# Patient Record
Sex: FEMALE | URBAN - METROPOLITAN AREA
[De-identification: names, ages, dates, MRNs, and addresses within clinical notes are randomized per-mention and may not be internally consistent; named-entity substitution may affect disease eponyms.]

---

## 2023-12-12 ENCOUNTER — HOSPITAL ENCOUNTER (OUTPATIENT)
Age: 88
Setting detail: SPECIMEN
Discharge: HOME | End: 2023-12-12

## 2023-12-12 LAB
ALBUMIN SERPL-MCNC: 3.9 G/DL (ref 3.5–5.2)
ALP SERPL-CCNC: 96 U/L (ref 35–104)
ALT SERPL-CCNC: 15 U/L (ref 5–33)
ANION GAP SERPL CALCULATED.3IONS-SCNC: 13 MMOL/L (ref 9–17)
AST SERPL-CCNC: 27 U/L
BILIRUB SERPL-MCNC: 0.2 MG/DL (ref 0.3–1.2)
BUN SERPL-MCNC: 38 MG/DL (ref 8–23)
BUN/CREAT SERPL: 29 (ref 9–20)
CALCIUM SERPL-MCNC: 9 MG/DL (ref 8.6–10.4)
CHLORIDE SERPL-SCNC: 105 MMOL/L (ref 98–107)
CO2 SERPL-SCNC: 21 MMOL/L (ref 20–31)
CREAT SERPL-MCNC: 1.3 MG/DL (ref 0.5–0.9)
ERYTHROCYTE [DISTWIDTH] IN BLOOD BY AUTOMATED COUNT: 12.7 % (ref 11.8–14.4)
GFR SERPL CREATININE-BSD FRML MDRD: 40 ML/MIN/1.73M2
GLUCOSE SERPL-MCNC: 81 MG/DL (ref 70–99)
HCT VFR BLD AUTO: 35.3 % (ref 36.3–47.1)
HGB BLD-MCNC: 11.7 G/DL (ref 11.9–15.1)
MCH RBC QN AUTO: 32.3 PG (ref 25.2–33.5)
MCHC RBC AUTO-ENTMCNC: 33.1 G/DL (ref 28.4–34.8)
MCV RBC AUTO: 97.5 FL (ref 82.6–102.9)
NRBC BLD-RTO: 0 PER 100 WBC
PLATELET # BLD AUTO: 258 K/UL (ref 138–453)
PMV BLD AUTO: 11.2 FL (ref 8.1–13.5)
POTASSIUM SERPL-SCNC: 4 MMOL/L (ref 3.7–5.3)
PROT SERPL-MCNC: 7 G/DL (ref 6.4–8.3)
RBC # BLD AUTO: 3.62 M/UL (ref 3.95–5.11)
SODIUM SERPL-SCNC: 139 MMOL/L (ref 135–144)
WBC OTHER # BLD: 9.9 K/UL (ref 3.5–11.3)

## 2023-12-12 PROCEDURE — 80053 COMPREHEN METABOLIC PANEL: CPT

## 2023-12-12 PROCEDURE — 36415 COLL VENOUS BLD VENIPUNCTURE: CPT

## 2023-12-12 PROCEDURE — P9603 ONE-WAY ALLOW PRORATED MILES: HCPCS

## 2023-12-12 PROCEDURE — 85027 COMPLETE CBC AUTOMATED: CPT

## 2024-01-09 ENCOUNTER — APPOINTMENT (OUTPATIENT)
Dept: CT IMAGING | Age: 89
End: 2024-01-09
Payer: COMMERCIAL

## 2024-01-09 ENCOUNTER — HOSPITAL ENCOUNTER (INPATIENT)
Age: 89
LOS: 3 days | Discharge: NURSING, REHAB OR LONG-TERM CARE | End: 2024-01-12
Attending: EMERGENCY MEDICINE | Admitting: PSYCHIATRY & NEUROLOGY
Payer: COMMERCIAL

## 2024-01-09 DIAGNOSIS — I63.9 CEREBROVASCULAR ACCIDENT (CVA), UNSPECIFIED MECHANISM (HCC): Primary | ICD-10-CM

## 2024-01-09 LAB
ANION GAP SERPL CALCULATED.3IONS-SCNC: 9 MMOL/L (ref 9–17)
BASOPHILS # BLD: 0.08 K/UL (ref 0–0.2)
BASOPHILS NFR BLD: 1 % (ref 0–2)
BUN BLD-MCNC: 32 MG/DL (ref 8–26)
BUN SERPL-MCNC: 33 MG/DL (ref 8–23)
CA-I BLD-SCNC: 1.14 MMOL/L (ref 1.15–1.33)
CALCIUM SERPL-MCNC: 8.4 MG/DL (ref 8.6–10.4)
CHLORIDE BLD-SCNC: 106 MMOL/L (ref 98–107)
CHLORIDE SERPL-SCNC: 103 MMOL/L (ref 98–107)
CK SERPL-CCNC: 72 U/L (ref 26–192)
CO2 BLD CALC-SCNC: 26 MMOL/L (ref 22–30)
CO2 SERPL-SCNC: 25 MMOL/L (ref 20–31)
CREAT SERPL-MCNC: 1.4 MG/DL (ref 0.5–0.9)
EGFR, POC: 37 ML/MIN/1.73M2
EOSINOPHIL # BLD: 0.23 K/UL (ref 0–0.44)
EOSINOPHILS RELATIVE PERCENT: 3 % (ref 1–4)
ERYTHROCYTE [DISTWIDTH] IN BLOOD BY AUTOMATED COUNT: 12.3 % (ref 11.8–14.4)
GFR SERPL CREATININE-BSD FRML MDRD: 37 ML/MIN/1.73M2
GLUCOSE BLD-MCNC: 105 MG/DL (ref 74–100)
GLUCOSE SERPL-MCNC: 108 MG/DL (ref 70–99)
HCO3 VENOUS: 26.9 MMOL/L (ref 22–29)
HCT VFR BLD AUTO: 34.6 % (ref 36.3–47.1)
HCT VFR BLD AUTO: 35 % (ref 36–46)
HGB BLD-MCNC: 11.6 G/DL (ref 11.9–15.1)
IMM GRANULOCYTES # BLD AUTO: 0.05 K/UL (ref 0–0.3)
IMM GRANULOCYTES NFR BLD: 1 %
INR PPP: 0.9
LYMPHOCYTES NFR BLD: 2.41 K/UL (ref 1.1–3.7)
LYMPHOCYTES RELATIVE PERCENT: 27 % (ref 24–43)
MCH RBC QN AUTO: 32 PG (ref 25.2–33.5)
MCHC RBC AUTO-ENTMCNC: 33.5 G/DL (ref 28.4–34.8)
MCV RBC AUTO: 95.6 FL (ref 82.6–102.9)
MONOCYTES NFR BLD: 1.04 K/UL (ref 0.1–1.2)
MONOCYTES NFR BLD: 12 % (ref 3–12)
MYOGLOBIN SERPL-MCNC: 46 NG/ML (ref 25–58)
NEUTROPHILS NFR BLD: 56 % (ref 36–65)
NEUTS SEG NFR BLD: 4.97 K/UL (ref 1.5–8.1)
NRBC BLD-RTO: 0 PER 100 WBC
O2 SAT, VEN: 38.5 % (ref 60–85)
PARTIAL THROMBOPLASTIN TIME: 24.5 SEC (ref 23–36.5)
PCO2, VEN: 46.2 MM HG (ref 41–51)
PH VENOUS: 7.37 (ref 7.32–7.43)
PLATELET # BLD AUTO: 248 K/UL (ref 138–453)
PMV BLD AUTO: 10.5 FL (ref 8.1–13.5)
PO2, VEN: 23.3 MM HG (ref 30–50)
POC ANION GAP: 11 MMOL/L (ref 7–16)
POC CREATININE: 1.4 MG/DL (ref 0.51–1.19)
POC HEMOGLOBIN (CALC): 11.9 G/DL (ref 12–16)
POC LACTIC ACID: 0.7 MMOL/L (ref 0.56–1.39)
POSITIVE BASE EXCESS, VEN: 1.2 MMOL/L (ref 0–3)
POTASSIUM BLD-SCNC: 4 MMOL/L (ref 3.5–4.5)
POTASSIUM SERPL-SCNC: 3.9 MMOL/L (ref 3.7–5.3)
PROTHROMBIN TIME: 12.2 SEC (ref 11.7–14.9)
RBC # BLD AUTO: 3.62 M/UL (ref 3.95–5.11)
SODIUM BLD-SCNC: 142 MMOL/L (ref 138–146)
SODIUM SERPL-SCNC: 137 MMOL/L (ref 135–144)
TROPONIN I SERPL HS-MCNC: 10 NG/L (ref 0–14)
WBC OTHER # BLD: 8.8 K/UL (ref 3.5–11.3)

## 2024-01-09 PROCEDURE — 83605 ASSAY OF LACTIC ACID: CPT

## 2024-01-09 PROCEDURE — 83874 ASSAY OF MYOGLOBIN: CPT

## 2024-01-09 PROCEDURE — 82947 ASSAY GLUCOSE BLOOD QUANT: CPT

## 2024-01-09 PROCEDURE — 70450 CT HEAD/BRAIN W/O DYE: CPT

## 2024-01-09 PROCEDURE — 2060000000 HC ICU INTERMEDIATE R&B

## 2024-01-09 PROCEDURE — 84520 ASSAY OF UREA NITROGEN: CPT

## 2024-01-09 PROCEDURE — 99285 EMERGENCY DEPT VISIT HI MDM: CPT

## 2024-01-09 PROCEDURE — 82803 BLOOD GASES ANY COMBINATION: CPT

## 2024-01-09 PROCEDURE — 82565 ASSAY OF CREATININE: CPT

## 2024-01-09 PROCEDURE — 80048 BASIC METABOLIC PNL TOTAL CA: CPT

## 2024-01-09 PROCEDURE — 82550 ASSAY OF CK (CPK): CPT

## 2024-01-09 PROCEDURE — 85610 PROTHROMBIN TIME: CPT

## 2024-01-09 PROCEDURE — 85025 COMPLETE CBC W/AUTO DIFF WBC: CPT

## 2024-01-09 PROCEDURE — 82553 CREATINE MB FRACTION: CPT

## 2024-01-09 PROCEDURE — 80051 ELECTROLYTE PANEL: CPT

## 2024-01-09 PROCEDURE — 85014 HEMATOCRIT: CPT

## 2024-01-09 PROCEDURE — 85730 THROMBOPLASTIN TIME PARTIAL: CPT

## 2024-01-09 PROCEDURE — 82330 ASSAY OF CALCIUM: CPT

## 2024-01-09 PROCEDURE — 84484 ASSAY OF TROPONIN QUANT: CPT

## 2024-01-09 RX ORDER — LABETALOL HYDROCHLORIDE 5 MG/ML
10 INJECTION, SOLUTION INTRAVENOUS ONCE
Status: DISCONTINUED | OUTPATIENT
Start: 2024-01-09 | End: 2024-01-12 | Stop reason: HOSPADM

## 2024-01-09 RX ORDER — CLOPIDOGREL 300 MG/1
300 TABLET, FILM COATED ORAL ONCE
Status: COMPLETED | OUTPATIENT
Start: 2024-01-09 | End: 2024-01-10

## 2024-01-10 ENCOUNTER — APPOINTMENT (OUTPATIENT)
Dept: MRI IMAGING | Age: 89
End: 2024-01-10
Payer: COMMERCIAL

## 2024-01-10 PROBLEM — Z71.89 ACP (ADVANCE CARE PLANNING): Status: ACTIVE | Noted: 2024-01-10

## 2024-01-10 PROBLEM — Z51.5 ENCOUNTER FOR PALLIATIVE CARE: Status: ACTIVE | Noted: 2024-01-10

## 2024-01-10 LAB
ANION GAP SERPL CALCULATED.3IONS-SCNC: 9 MMOL/L (ref 9–17)
BUN SERPL-MCNC: 30 MG/DL (ref 8–23)
CALCIUM SERPL-MCNC: 9.1 MG/DL (ref 8.6–10.4)
CHLORIDE SERPL-SCNC: 105 MMOL/L (ref 98–107)
CHOLEST SERPL-MCNC: 123 MG/DL
CHOLESTEROL/HDL RATIO: 2.3
CO2 SERPL-SCNC: 27 MMOL/L (ref 20–31)
CREAT SERPL-MCNC: 1.4 MG/DL (ref 0.5–0.9)
EKG ATRIAL RATE: 68 BPM
EKG P AXIS: 14 DEGREES
EKG P-R INTERVAL: 138 MS
EKG Q-T INTERVAL: 440 MS
EKG QRS DURATION: 76 MS
EKG QTC CALCULATION (BAZETT): 467 MS
EKG R AXIS: 21 DEGREES
EKG T AXIS: -22 DEGREES
EKG VENTRICULAR RATE: 68 BPM
ERYTHROCYTE [DISTWIDTH] IN BLOOD BY AUTOMATED COUNT: 12.3 % (ref 11.8–14.4)
EST. AVERAGE GLUCOSE BLD GHB EST-MCNC: 82 MG/DL
GFR SERPL CREATININE-BSD FRML MDRD: 37 ML/MIN/1.73M2
GLUCOSE SERPL-MCNC: 104 MG/DL (ref 70–99)
HBA1C MFR BLD: 4.5 % (ref 4–6)
HCT VFR BLD AUTO: 35.3 % (ref 36.3–47.1)
HDLC SERPL-MCNC: 54 MG/DL
HGB BLD-MCNC: 12.1 G/DL (ref 11.9–15.1)
LDLC SERPL CALC-MCNC: 49 MG/DL (ref 0–130)
MCH RBC QN AUTO: 31.8 PG (ref 25.2–33.5)
MCHC RBC AUTO-ENTMCNC: 34.3 G/DL (ref 28.4–34.8)
MCV RBC AUTO: 92.7 FL (ref 82.6–102.9)
NRBC BLD-RTO: 0 PER 100 WBC
PLATELET # BLD AUTO: 269 K/UL (ref 138–453)
PMV BLD AUTO: 10.3 FL (ref 8.1–13.5)
POTASSIUM SERPL-SCNC: 4 MMOL/L (ref 3.7–5.3)
RBC # BLD AUTO: 3.81 M/UL (ref 3.95–5.11)
SODIUM SERPL-SCNC: 141 MMOL/L (ref 135–144)
TRIGL SERPL-MCNC: 98 MG/DL
WBC OTHER # BLD: 9 K/UL (ref 3.5–11.3)

## 2024-01-10 PROCEDURE — 2060000000 HC ICU INTERMEDIATE R&B

## 2024-01-10 PROCEDURE — 94761 N-INVAS EAR/PLS OXIMETRY MLT: CPT

## 2024-01-10 PROCEDURE — 83036 HEMOGLOBIN GLYCOSYLATED A1C: CPT

## 2024-01-10 PROCEDURE — 80061 LIPID PANEL: CPT

## 2024-01-10 PROCEDURE — 99221 1ST HOSP IP/OBS SF/LOW 40: CPT | Performed by: INTERNAL MEDICINE

## 2024-01-10 PROCEDURE — 2580000003 HC RX 258

## 2024-01-10 PROCEDURE — 6360000002 HC RX W HCPCS

## 2024-01-10 PROCEDURE — 80048 BASIC METABOLIC PNL TOTAL CA: CPT

## 2024-01-10 PROCEDURE — 6370000000 HC RX 637 (ALT 250 FOR IP): Performed by: STUDENT IN AN ORGANIZED HEALTH CARE EDUCATION/TRAINING PROGRAM

## 2024-01-10 PROCEDURE — 92523 SPEECH SOUND LANG COMPREHEN: CPT

## 2024-01-10 PROCEDURE — 70551 MRI BRAIN STEM W/O DYE: CPT

## 2024-01-10 PROCEDURE — 6370000000 HC RX 637 (ALT 250 FOR IP)

## 2024-01-10 PROCEDURE — 93005 ELECTROCARDIOGRAM TRACING: CPT

## 2024-01-10 PROCEDURE — 85027 COMPLETE CBC AUTOMATED: CPT

## 2024-01-10 PROCEDURE — 93010 ELECTROCARDIOGRAM REPORT: CPT | Performed by: INTERNAL MEDICINE

## 2024-01-10 RX ORDER — ASPIRIN 81 MG/1
81 TABLET ORAL DAILY
Status: DISCONTINUED | OUTPATIENT
Start: 2024-01-10 | End: 2024-01-12 | Stop reason: HOSPADM

## 2024-01-10 RX ORDER — SODIUM CHLORIDE 0.9 % (FLUSH) 0.9 %
5-40 SYRINGE (ML) INJECTION EVERY 12 HOURS SCHEDULED
Status: DISCONTINUED | OUTPATIENT
Start: 2024-01-10 | End: 2024-01-12 | Stop reason: HOSPADM

## 2024-01-10 RX ORDER — CLONAZEPAM 0.5 MG/1
0.5 TABLET ORAL EVERY 12 HOURS PRN
Status: DISCONTINUED | OUTPATIENT
Start: 2024-01-10 | End: 2024-01-12 | Stop reason: HOSPADM

## 2024-01-10 RX ORDER — MEMANTINE HYDROCHLORIDE 5 MG/1
10 TABLET ORAL 2 TIMES DAILY
Status: DISCONTINUED | OUTPATIENT
Start: 2024-01-10 | End: 2024-01-12

## 2024-01-10 RX ORDER — PAROXETINE HYDROCHLORIDE 20 MG/1
20 TABLET, FILM COATED ORAL DAILY
Status: DISCONTINUED | OUTPATIENT
Start: 2024-01-10 | End: 2024-01-12 | Stop reason: HOSPADM

## 2024-01-10 RX ORDER — ROSUVASTATIN CALCIUM 10 MG/1
20 TABLET, COATED ORAL NIGHTLY
Status: DISCONTINUED | OUTPATIENT
Start: 2024-01-10 | End: 2024-01-12

## 2024-01-10 RX ORDER — SODIUM CHLORIDE 0.9 % (FLUSH) 0.9 %
5-40 SYRINGE (ML) INJECTION PRN
Status: DISCONTINUED | OUTPATIENT
Start: 2024-01-10 | End: 2024-01-12 | Stop reason: HOSPADM

## 2024-01-10 RX ORDER — ROSUVASTATIN CALCIUM 20 MG/1
40 TABLET, COATED ORAL NIGHTLY
Status: DISCONTINUED | OUTPATIENT
Start: 2024-01-10 | End: 2024-01-10

## 2024-01-10 RX ORDER — ONDANSETRON 2 MG/ML
4 INJECTION INTRAMUSCULAR; INTRAVENOUS EVERY 6 HOURS PRN
Status: DISCONTINUED | OUTPATIENT
Start: 2024-01-10 | End: 2024-01-12 | Stop reason: HOSPADM

## 2024-01-10 RX ORDER — ONDANSETRON 4 MG/1
4 TABLET, ORALLY DISINTEGRATING ORAL EVERY 8 HOURS PRN
Status: DISCONTINUED | OUTPATIENT
Start: 2024-01-10 | End: 2024-01-12 | Stop reason: HOSPADM

## 2024-01-10 RX ORDER — CHOLECALCIFEROL (VITAMIN D3) 125 MCG
5 CAPSULE ORAL NIGHTLY
Status: DISCONTINUED | OUTPATIENT
Start: 2024-01-10 | End: 2024-01-12 | Stop reason: HOSPADM

## 2024-01-10 RX ORDER — POLYETHYLENE GLYCOL 3350 17 G/17G
17 POWDER, FOR SOLUTION ORAL DAILY PRN
Status: DISCONTINUED | OUTPATIENT
Start: 2024-01-10 | End: 2024-01-12 | Stop reason: HOSPADM

## 2024-01-10 RX ORDER — ENOXAPARIN SODIUM 100 MG/ML
30 INJECTION SUBCUTANEOUS DAILY
Status: DISCONTINUED | OUTPATIENT
Start: 2024-01-10 | End: 2024-01-12 | Stop reason: HOSPADM

## 2024-01-10 RX ORDER — SODIUM CHLORIDE 9 MG/ML
INJECTION, SOLUTION INTRAVENOUS PRN
Status: DISCONTINUED | OUTPATIENT
Start: 2024-01-10 | End: 2024-01-12 | Stop reason: HOSPADM

## 2024-01-10 RX ORDER — LISINOPRIL 10 MG/1
10 TABLET ORAL DAILY
Status: DISCONTINUED | OUTPATIENT
Start: 2024-01-10 | End: 2024-01-12 | Stop reason: HOSPADM

## 2024-01-10 RX ORDER — CLOPIDOGREL BISULFATE 75 MG/1
75 TABLET ORAL DAILY
Status: DISCONTINUED | OUTPATIENT
Start: 2024-01-10 | End: 2024-01-12 | Stop reason: HOSPADM

## 2024-01-10 RX ORDER — LABETALOL HYDROCHLORIDE 5 MG/ML
10 INJECTION, SOLUTION INTRAVENOUS EVERY 4 HOURS PRN
Status: DISCONTINUED | OUTPATIENT
Start: 2024-01-10 | End: 2024-01-12 | Stop reason: HOSPADM

## 2024-01-10 RX ADMIN — CLOPIDOGREL BISULFATE 75 MG: 75 TABLET ORAL at 09:15

## 2024-01-10 RX ADMIN — SODIUM CHLORIDE, PRESERVATIVE FREE 10 ML: 5 INJECTION INTRAVENOUS at 21:02

## 2024-01-10 RX ADMIN — ROSUVASTATIN CALCIUM 40 MG: 20 TABLET, FILM COATED ORAL at 01:34

## 2024-01-10 RX ADMIN — LISINOPRIL 10 MG: 10 TABLET ORAL at 20:55

## 2024-01-10 RX ADMIN — ROSUVASTATIN CALCIUM 20 MG: 10 TABLET, COATED ORAL at 20:55

## 2024-01-10 RX ADMIN — CLOPIDOGREL BISULFATE 300 MG: 300 TABLET, FILM COATED ORAL at 00:13

## 2024-01-10 RX ADMIN — MEMANTINE HYDROCHLORIDE 10 MG: 5 TABLET, FILM COATED ORAL at 09:15

## 2024-01-10 RX ADMIN — ENOXAPARIN SODIUM 30 MG: 100 INJECTION SUBCUTANEOUS at 09:15

## 2024-01-10 RX ADMIN — CLONAZEPAM 0.5 MG: 0.5 TABLET ORAL at 20:55

## 2024-01-10 RX ADMIN — SODIUM CHLORIDE, PRESERVATIVE FREE 10 ML: 5 INJECTION INTRAVENOUS at 09:14

## 2024-01-10 RX ADMIN — PAROXETINE HYDROCHLORIDE HEMIHYDRATE 20 MG: 20 TABLET, FILM COATED ORAL at 20:55

## 2024-01-10 RX ADMIN — MEMANTINE HYDROCHLORIDE 10 MG: 5 TABLET, FILM COATED ORAL at 20:55

## 2024-01-10 RX ADMIN — ASPIRIN 81 MG: 81 TABLET, COATED ORAL at 09:15

## 2024-01-10 ASSESSMENT — ENCOUNTER SYMPTOMS
NAUSEA: 0
SHORTNESS OF BREATH: 0
VOMITING: 0
ABDOMINAL PAIN: 0

## 2024-01-10 NOTE — H&P
[facility]   IN-PATIENT SERVICE   [facility]    HISTORY AND PHYSICAL EXAMINATION            Date:   1/10/2024  Patient name:  Michelle Verma  Date of admission:  1/9/2024 10:39 PM  MRN:   [id number]  Account:  [id number]  YOB: 1935  PCP:    Oliver Gilliam MD  Room:   14/14  Code Status:    Full Code    Chief Complaint:     Chief Complaint   Patient presents with    Loss of Consciousness     History Obtained From:     patient, family member - son and daughter, electronic medical record    History of Present Illness:     The patient is a 88 y.o.  Non- / non  female who presents with Loss of Consciousness   and she is admitted to the hospital for the management of  stroke-like symptoms    The patient is a 88 y.o. female with PMH of dementia who was brought in by EMS from the nursing facility as RACE alert after witnessed fall due to acute onset right-sided weakness.    Upon initial assessment immediatly after arrival patient is alert and oriented x 3, cooperative, was given NIH SS of 9 for R sided hemiparesis and slight flattening of  R nasolabial fold, no other neurological deficit including numbness, gaze preference, hemineglect, aphasia or ataxia.     CT head reported with hypodensity in the right parietal lobe could be recent infarct given sulcal effacement that does not correspond to patient's neurological exam findings.     Upon medical chart review patient had recent admission due to frequent falls and bilateral leg weakness, MRI brain 12/07/2023 was obtained and reported \"diffusion restriction of the left superior frontal gyrus with associated gyriform enhancement and increased T1 signal favored to represent subacute stroke with laminar necrosis.  Additional focal area of likely acute to subacute stroke of the left parietal lobe and left frontal white matter.  Areas are likely within left anterior cerebral artery distribution with accompanying  that had been slowly improving over the course in ED, 3/5 in both upper and lower extremities on most recent assessment. CTH is negative for hemorrhage, revealed hypodensity in the right parietal lobe that does not correspond to patient's clinical presentation.       Plan:     Acute ischemic stroke vs TIA, etiology is unclear    - CTH WO: Hypodensity in the right parietal lobe could be recent infarct given sulcal effacement. Multifocal small-vessel and large vessel territory encephalomalacia changes are noted.  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  A1c DM Goal: <7.0%  - Maintain permissive hypertension SBP< 200, while MRI is pending    Dementia  - Memotine resumed     Diet: adult regular   CODE STATUS: Full code at this time, however, patient has documentation of DNR CC, upon discussion in ER she stated that she is welling to receive treatment and further diagnostical work up. She stated she is not willing to underwent any interventions and to get any treatment only at the point when her condition is poor and further management would be futile.  CODE STATUS was also discussed with the family due to concern for patient's capacity to make clinical decisions given his history of dementia, Sarah presented to bedside and are insisting on full code.  They were explained that CODE STATUS should represent the patient's wishes.  Recommend palliative care consult with family participation in order to explain the meaning of different CODE

## 2024-01-10 NOTE — ED NOTES
The following labs labeled with pt sticker and tubed to lab:     [] Blue     [x] Lavender   [] on ice  [x] Green/yellow  [] Green/black [] on ice  [] Yellow  [] Red  [] Pink      [] COVID-19 swab    [] Rapid  [] PCR  [] Flu Swab  [] Strep Swab  [] Peds Viral Panel     [] Urine Sample  [] Pelvic Cultures  [] Blood Cultures   [] Wound Cultures

## 2024-01-10 NOTE — ED NOTES
Patient had a large bowel movement in her brief. Patient was cleaned up, a clean brief was placed and a new gown was put on patient.

## 2024-01-10 NOTE — PROGRESS NOTES
SLP ALL NOTES  Facility/Department: [facility] ED  Initial Speech/Language/Cognitive Assessment    NAME: Michelle Verma  : 1935   MRN: [id number]  ADMISSION DATE: 2024  ADMITTING DIAGNOSIS: has Acute ischemic stroke (HCC); Encounter for palliative care; and ACP (advance care planning) on their problem list.    Date of Eval: 1/10/2024   Evaluating Therapist: LOGAN BEAL    Primary Complaint: The patient is a 88 y.o.  Non- / non  female who presents with Loss of Consciousness   and she is admitted to the hospital for the management of  stroke-like symptoms     The patient is a 88 y.o. female with PMH of dementia who was brought in by EMS from the nursing facility as RACE alert after witnessed fall due to acute onset right-sided weakness.    Upon initial assessment immediatly after arrival patient is alert and oriented x 3, cooperative, was given NIH SS of 9 for R sided hemiparesis and slight flattening of  R nasolabial fold, no other neurological deficit including numbness, gaze preference, hemineglect, aphasia or ataxia.     CT head reported with hypodensity in the right parietal lobe could be recent infarct given sulcal effacement that does not correspond to patient's neurological exam findings.     Upon medical chart review patient had recent admission due to frequent falls and bilateral leg weakness, MRI brain 2023 was obtained and reported \"diffusion restriction of the left superior frontal gyrus with associated gyriform enhancement and increased T1 signal favored to represent subacute stroke with laminar necrosis.  Additional focal area of likely acute to subacute stroke of the left parietal lobe and left frontal white matter.  Areas are likely within left anterior cerebral artery distribution with accompanying irregular small appearance of the distal left anterior cerebral artery.  Focal area of diffusion restriction and questionable patchy central enhancement

## 2024-01-10 NOTE — ED PROVIDER NOTES
Faculty Sign-Out Attestation  Handoff taken on the following patient from prior Attending Physician: Guy    I was available and discussed any additional care issues that arose and coordinated the management plans with the resident(s) caring for the patient during my duty period. Any areas of disagreement with resident’s documentation of care or procedures are noted on the chart. I was personally present for the key portions of any/all procedures during my duty period. I have documented in the chart those procedures where I was not present during the key portions.    Stroke alert, dnrcc,   Eval started    Neuro admit    Dell Julio DO  Attending Physician       Dell Julio DO  01/09/24 9572       Dell Julio DO  01/10/24 0132

## 2024-01-10 NOTE — ED NOTES
ED to inpatient nurses report      Chief Complaint:  Chief Complaint   Patient presents with    Loss of Consciousness     Present to ED from: SNF ([facility])    MOA:     LOC: alert and orientated to name, place, date  Mobility: Fully dependent  Oxygen Baseline: RA    Current needs required: RA   Pending ED orders: none  Present condition: stable, NIHSS 8    Why did the patient come to the ED?   Presented to ED from SNF due to loss of consiousness and right sided weakness  Pt was walking when suddenly loss her consciousness  Pt is awake and oriented upon arrival  Pt had a brain surgery 2021, brain tumor removal (benign)  Stroke alert activated  Ct scan done  Hooked pt to full cardiac monitor    What is the plan? Admission, neuro consult  Any procedures or intervention occur? Stroke panel, Ct Head, for MRI in AM (checklist completed)  Any safety concerns?? Fall risk    Mental Status:   Alert    Psych Assessment:      Vital signs   Vitals:    01/09/24 2250 01/09/24 2257 01/09/24 2300 01/09/24 2302   BP:   (!) 158/98 (!) 170/76   Pulse: 89 79 82 79   Resp: 11 17 16 19   Temp: 98.3 °F (36.8 °C)      TempSrc: Oral      SpO2: 98% 97% 98% 97%   Weight: 68.5 kg (151 lb) 70 kg (154 lb 5.2 oz)          Vitals:  Patient Vitals for the past 24 hrs:   BP Temp Temp src Pulse Resp SpO2 Weight   01/09/24 2302 (!) 170/76 -- -- 79 19 97 % --   01/09/24 2300 (!) 158/98 -- -- 82 16 98 % --   01/09/24 2257 -- -- -- 79 17 97 % 70 kg (154 lb 5.2 oz)   01/09/24 2250 -- 98.3 °F (36.8 °C) Oral 89 11 98 % 68.5 kg (151 lb)   01/09/24 2249 (!) 169/90 -- -- 85 19 -- --      Visit Vitals  BP (!) 170/76   Pulse 79   Temp 98.3 °F (36.8 °C) (Oral)   Resp 19   Wt 70 kg (154 lb 5.2 oz)   SpO2 97%        LDAs:      Ambulatory Status:  No data recorded    Diagnosis:  DISPOSITION Admitted 01/09/2024 11:46:17 PM   Final diagnoses:   Cerebrovascular accident (CVA), unspecified mechanism (HCC)        Consults:  None     Treatment Team:   Treatment Team:

## 2024-01-10 NOTE — ACP (ADVANCE CARE PLANNING)
Advance Care Planning     Advance Care Planning (ACP) Physician/NP/PA (Provider) Conversation      Date of ACP Conversation: 01/10/24    Conversation Conducted with:   Patient with Decision Making Capacity    Health Care Decision Maker:    Current Designated Health Care Decision Maker:  swetha Martínez    Care Preferences:    Hospitalization:  \"If your health worsens and it becomes clear that your chance of recovery is unlikely, what would your preference be regarding hospitalization?\"  Currently Hospitalized    Ventilation:  \"If you were in your present state of health and suddenly became very ill and were unable to breathe on your own, what would your preference be about the use of a ventilator (breathing machine) if it was available to you?\"    NO INTUBATION    \"If your health worsens and it becomes clear that your chance of recovery is unlikely, what would your preference be about the use of a ventilator (breathing machine) if it was available to you?\"   NO INTUBATION    Resuscitation:  \"CPR works best to restart the heart when there is a sudden event, like a heart attack, in someone who is otherwise healthy. Unfortunately, CPR does not typically restart the heart for people who have serious health conditions or who are very sick.\"    \"In the event your heart stopped as a result of an underlying serious health condition, would you want attempts to be made to restart your heart (answer \"yes\" for attempt to resuscitate) or would you prefer a natural death (answer \"no\" for do not attempt to resuscitate)?\"   DNR-CCA     Conversation Outcomes / Follow-Up Plan:   Need ACP documents brought in  She notes that she has them completed  DNR-CCA, no intubation    Length of Voluntary ACP Conversation in minutes:  <16 minutes (Non-Billable)    ALBANIA MIKE DO

## 2024-01-10 NOTE — PLAN OF CARE
Problem: Safety - Adult  Goal: Free from fall injury  Outcome: Progressing  Flowsheets (Taken 1/10/2024 1805)  Free From Fall Injury: Instruct family/caregiver on patient safety     Problem: Discharge Planning  Goal: Discharge to home or other facility with appropriate resources  Outcome: Progressing  Flowsheets (Taken 1/10/2024 1800)  Discharge to home or other facility with appropriate resources:   Identify barriers to discharge with patient and caregiver   Arrange for needed discharge resources and transportation as appropriate   Identify discharge learning needs (meds, wound care, etc)   Arrange for interpreters to assist at discharge as needed   Refer to discharge planning if patient needs post-hospital services based on physician order or complex needs related to functional status, cognitive ability or social support system     Problem: Skin/Tissue Integrity  Goal: Absence of new skin breakdown  Description: 1.  Monitor for areas of redness and/or skin breakdown  2.  Assess vascular access sites hourly  3.  Every 4-6 hours minimum:  Change oxygen saturation probe site  4.  Every 4-6 hours:  If on nasal continuous positive airway pressure, respiratory therapy assess nares and determine need for appliance change or resting period.  Outcome: Progressing     Problem: ABCDS Injury Assessment  Goal: Absence of physical injury  Outcome: Progressing

## 2024-01-10 NOTE — ED PROVIDER NOTES
on file.    Allergies:  Fentanyl    Home Medications:  Prior to Admission medications    Not on File       REVIEW OF SYSTEMS       Review of Systems   Constitutional:  Negative for chills and fever.   Respiratory:  Negative for shortness of breath.    Cardiovascular:  Negative for chest pain.   Gastrointestinal:  Negative for abdominal pain, nausea and vomiting.   Neurological:  Positive for weakness and numbness. Negative for syncope.     PHYSICAL EXAM      INITIAL VITALS:   BP (!) 175/105   Pulse 79   Temp 98.3 °F (36.8 °C) (Oral)   Resp 15   Ht 1.575 m (5' 2\")   Wt 70 kg (154 lb 5.2 oz)   SpO2 96%   BMI 28.23 kg/m²     Physical Exam  Vitals and nursing note reviewed.   Constitutional:       General: She is not in acute distress.  HENT:      Mouth/Throat:      Mouth: Mucous membranes are moist.      Pharynx: Oropharynx is clear.   Eyes:      Extraocular Movements: Extraocular movements intact.      Pupils: Pupils are equal, round, and reactive to light.   Cardiovascular:      Rate and Rhythm: Normal rate and regular rhythm.   Pulmonary:      Effort: Pulmonary effort is normal. No respiratory distress.      Breath sounds: Normal breath sounds.   Abdominal:      Palpations: Abdomen is soft.      Tenderness: There is no abdominal tenderness. There is no guarding or rebound.   Skin:     General: Skin is warm and dry.   Neurological:      Mental Status: She is alert.      Comments: See NIH below.       DDX/DIAGNOSTIC RESULTS / EMERGENCY DEPARTMENT COURSE / MDM     Medical Decision Making  88-year-old female brought in by EMS as RACE alert.  Per staff at patient's nursing facility, they were walking with the patient and when they turned around to see why she is not next to them she was on the ground.  There to help her up and she did not have any movement of her right arm or right leg.  She has a history of strokes, but did not have any residual deficits, so these findings were new.  Last known well 2130.   Patient taken to CT immediately upon arrival, neurology and stroke nurse at bedside.  Right arm and leg weakness, decreased sensation on exam.  Does have history of dementia, sometimes is confused about her medical history, but otherwise does answer questions appropriately.    Patient has DNR CC documentation from December.    Amount and/or Complexity of Data Reviewed  External Data Reviewed: radiology.     Details: Patient normally goes to [facility].  She appears to have a history of brain cancer, documented as both benign and malignant.  History of TIA, hypertension, frequent falls, stage III CKD, encephalopathy, dementia.       MRI 12/6 :   IMPRESSION:   1. There are diffusion restriction of the left superior frontal gyrus with associated gyriform enhancement and increased T1 signal favored to represent subacute stroke with laminar necrosis.  Additional focal area of likely acute to subacute stroke of the left parietal lobe and left frontal white  matter.  Areas are likely within left anterior cerebral artery distribution with accompanying irregular small appearance of the distal left anterior cerebral artery.   2.  Focal area of diffusion restriction and questionable patchy central enhancement with masslike appearance of the left anterior body the corpus callosum which is also favored to represent subacute stroke given other findings, however given the masslike appearance follow-up MRI brain with contrast  in 2-3 months recommended to confirm decrease in mass effect.   3.  Additional stable nonischemic and postsurgical findings as described in full report    Labs: ordered.  Radiology: ordered. Decision-making details documented in ED Course.    Risk  Prescription drug management.  Decision regarding hospitalization.      NIH Stroke Scale    Time Performed:  1045 PM     1a.  Level of consciousness:  0 - alert; keenly responsive  1b.  Level of consciousness questions:  0 - answers both questions correctly  1c.

## 2024-01-10 NOTE — ED NOTES
ED to inpatient nurses report      Chief Complaint:  Chief Complaint   Patient presents with    Loss of Consciousness     Present to ED from: ECF ([facility])     MOA:     LOC: alert to only name  Mobility: Requires assistance * 2  Oxygen Baseline: RA    Current needs required: RA   Pending ED orders: admitted pt  Present condition: stable     Why did the patient come to the ED? LOC, syncope, had brain tumor removed in 2021  What is the plan? Neuro admit   Any procedures or intervention occur? CT, MRI   Any safety concerns?? Fall risk     Mental Status:       Psych Assessment:   Psychosocial  Psychosocial (WDL): Within Defined Limits  Vital signs   Vitals:    01/10/24 0641 01/10/24 0830 01/10/24 0900 01/10/24 1015   BP:   (!) 183/83    Pulse: 66 64 69 78   Resp: 19 21 17 19   Temp:       TempSrc:       SpO2: 94% 96% 98% 96%   Weight:       Height:            Vitals:  Patient Vitals for the past 24 hrs:   BP Temp Temp src Pulse Resp SpO2 Height Weight   01/10/24 1015 -- -- -- 78 19 96 % -- --   01/10/24 0900 (!) 183/83 -- -- 69 17 98 % -- --   01/10/24 0830 -- -- -- 64 21 96 % -- --   01/10/24 0641 -- -- -- 66 19 94 % -- --   01/10/24 0626 (!) 159/94 -- -- 64 17 95 % -- --   01/10/24 0516 (!) 169/80 -- -- 69 20 97 % -- --   01/10/24 0430 (!) 162/78 -- -- 74 18 96 % -- --   01/10/24 0411 -- -- -- 70 18 96 % -- --   01/10/24 0319 -- -- -- 69 14 98 % -- --   01/10/24 0304 (!) 188/83 -- -- 73 16 96 % -- --   01/10/24 0249 -- -- -- 73 18 95 % -- --   01/10/24 0234 (!) 176/77 -- -- 69 21 96 % -- --   01/10/24 0219 -- -- -- 76 15 97 % -- --   01/10/24 0204 (!) 176/96 -- -- 69 19 96 % -- --   01/10/24 0136 -- -- -- 77 19 96 % -- --   01/10/24 0130 (!) 163/104 -- -- 72 18 97 % -- --   01/10/24 0121 -- -- -- 81 15 97 % -- --   01/10/24 0106 -- -- -- 86 18 97 % -- --   01/10/24 0040 (!) 164/86 -- -- 74 21 94 % -- --   01/10/24 0030 -- -- -- 79 15 96 % -- --   01/10/24 0021 -- -- -- -- -- -- 1.575 m (5' 2\") --   01/10/24 0010  Result Value    POC Hemoglobin (calc) 11.9 (*)     POC Hematocrit 35 (*)     All other components within normal limits   CALCIUM, IONIC (POC) - Abnormal; Notable for the following components:    POC Ionized Calcium 1.14 (*)     All other components within normal limits   STROKE PANEL - Abnormal; Notable for the following components:    Glucose 108 (*)     BUN 33 (*)     Creatinine 1.4 (*)     Est, Glom Filt Rate 37 (*)     Calcium 8.4 (*)     RBC 3.62 (*)     Hemoglobin 11.6 (*)     Hematocrit 34.6 (*)     Immature Granulocytes 1 (*)     All other components within normal limits   CBC - Abnormal; Notable for the following components:    RBC 3.81 (*)     Hematocrit 35.3 (*)     All other components within normal limits   BASIC METABOLIC PANEL W/ REFLEX TO MG FOR LOW K - Abnormal; Notable for the following components:    Glucose 104 (*)     BUN 30 (*)     Creatinine 1.4 (*)     Est, Glom Filt Rate 37 (*)     All other components within normal limits   VENOUS BLOOD GAS, POINT OF CARE - Abnormal; Notable for the following components:    pO2, Stan 23.3 (*)     O2 Sat, Stan 38.5 (*)     All other components within normal limits   CREATININE W/GFR POINT OF CARE - Abnormal; Notable for the following components:    POC Creatinine 1.4 (*)     All other components within normal limits   UREA N (POC) - Abnormal; Notable for the following components:    POC BUN 32 (*)     All other components within normal limits   POCT GLUCOSE - Abnormal; Notable for the following components:    POC Glucose 105 (*)     All other components within normal limits   ELECTROLYTES PLUS   HEMOGLOBIN A1C   LIPID PANEL   LACTIC ACID,POINT OF CARE       Electronically signed by JESSICA WEAVER RN on 1/10/2024 at 11:53 AM

## 2024-01-10 NOTE — PROGRESS NOTES
[place] [facility] [place]    PATIENT NAME: Michelle Verma    Shift date: 1/09/2024  Shift day: Tuesday   Shift # 2    Room # 14/14   Name: Michelle Verma            Age: 88 y.o.  Gender: female          Orthodox: [employer]ism   Place of Jewish:     Trauma/Incident type: Stroke Alert/Race Alert  Admit Date & Time: 1/9/2024 10:39 PM  TRAUMA NAME: N/A    ADVANCE DIRECTIVES IN CHART?  No    NAME OF DECISION MAKER: N/A    RELATIONSHIP OF DECISION MAKER TO PATIENT: N/A    PATIENT/EVENT DESCRIPTION:  Michelle Verma is a 88 y.o. female who arrived via [facility] from [facility] [phone] as a Stroke Alert. Patient was initially brought to CT.  Pt to be admitted to 14/14.         SPIRITUAL ASSESSMENT-INTERVENTION-OUTCOME:   spoke with [facility] who stated patient was brought from [facility].  contacted [facility] and spoke to staff regarding contacting family. After being placed on hold, staff came back and said nurse was speaking to daughter.  informed staff patient was diverted from [place] and was brought to [facility]. Staff said she would let RN know. Writer introduced self as  to patient. Patient did not appear to mind  presence and engaged in conversation. Patient appeared anxious, coping, and hopeful when discussed her past health and the days events which led to her hospital visit. Patient stated [facility] staff had contacted son regarding her hospital visit and did not need  assistance at this time.  provided a supportive presence through active listening and words of affirmation.     PATIENT BELONGINGS:   writing did not handle patient belongings    ANY BELONGINGS OF SIGNIFICANT VALUE NOTED:  N/A    REGISTRATION STAFF NOTIFIED?  Yes      WHAT IS YOUR SPIRITUAL CARE PLAN FOR THIS PATIENT?:   Chaplains will remain available to offer spiritual and emotional support as needed.    Electronically signed by Javy

## 2024-01-10 NOTE — CONSULTS
full  _x_60%  Ambulation reduced; Significant disease; Can't do hobbies/housework; intake normal or reduced; occasional assist; LOC full/confusion  ___50%  Mainly sit/lie; Extensive disease; Can't do any work; Considerable assist; intake normal or reduced; LOC full/confusion  ___40%  Mainly in bed; Extensive disease; Mainly assist; intake normal or reduced; LOC full/confusion   ___30%  Bed Bound; Extensive disease; Total care; intake reduced; LOC full/confusion  ___20%  Bed Bound; Extensive disease; Total care; intake minimal; Drowsy/coma  ___10%  Bed Bound; Extensive disease; Total care; Mouth care only; Drowsy/coma  ___0       Death    Principle Problem/Diagnosis:  Principal Problem:    Acute ischemic stroke (HCC)  Active Problems:    Encounter for palliative care  Resolved Problems:    * No resolved hospital problems. *      Please call with any palliative questions or concerns.  Palliative Care Team is available via perfect serve or via phone.     This note has been dictated by dragon, typing errors may be a possibility.  The total time I spent in seeing the patient, discussing goals of care, advanced directives, code status, greater than 50% time in counseling and other major issues was more than 45 minutes      Electronically signed by      Xander Holland,   Hospice/Palliative Care  [facility], [place], [place]  1/10/2024 11:14 AM  Palliative care office: [phone]

## 2024-01-10 NOTE — ED NOTES
Pt soiled linens, purewick suspected to be leaking. Linens change, purewick adjusted, brief applied and perineal care completed. Pt tucked back in, call light in reach

## 2024-01-10 NOTE — ED PROVIDER NOTES
[facility]     Emergency Department     Faculty Attestation    I performed a history and physical examination of the patient and discussed management with the resident. I reviewed the resident´s note and agree with the documented findings and plan of care. Any areas of disagreement are noted on the chart. I was personally present for the key portions of any procedures. I have documented in the chart those procedures where I was not present during the key portions. I have reviewed the emergency nurses triage note. I agree with the chief complaint, past medical history, past surgical history, allergies, medications, social and family history as documented unless otherwise noted below. For Physician Assistant/ Nurse Practitioner cases/documentation I have personally evaluated this patient and have completed at least one if not all key elements of the E/M (history, physical exam, and MDM). Additional findings are as noted.    Stroke alert critical, good airway.     Jair Tovar MD  01/09/24 2903    DNR-CC paperwork arrived with the patient.       Jair Tovar MD  01/09/24 9654

## 2024-01-10 NOTE — ED NOTES
ED to inpatient nurses report        Chief Complaint:      Chief Complaint   Patient presents with    Loss of Consciousness      Present to ED from: SNF ([facility])     MOA:     LOC: alert and orientated to name, place, date  Mobility: Fully dependent  Oxygen Baseline: RA                  Current needs required: RA   Pending ED orders: none  Present condition: stable, NIHSS 5, NPO, on external catheter, NIHSS every 4 hours (next 0830H), full code     Why did the patient come to the ED?   Presented to ED from SNF due to loss of consiousness and right sided weakness  Pt was walking when suddenly loss her consciousness  Pt is awake and oriented upon arrival  Pt had a brain surgery 2021, brain tumor removal (benign)  Stroke alert activated  Ct scan done  Hooked pt to full cardiac monitor     What is the plan? Neuro Admission  Any procedures or intervention occur? Stroke panel, Ct Head, for MRI in AM (checklist completed)  Any safety concerns?? Fall risk     Mental Status:   Alert    Psych Assessment:   Psychosocial  Psychosocial (WDL): Within Defined Limits  Vital signs   Vitals:    01/10/24 0304 01/10/24 0319 01/10/24 0411 01/10/24 0430   BP: (!) 188/83   (!) 162/78   Pulse: 73 69 70 74   Resp: 16 14 18 18   Temp:       TempSrc:       SpO2: 96% 98% 96% 96%   Weight:       Height:            Vitals:  Patient Vitals for the past 24 hrs:   BP Temp Temp src Pulse Resp SpO2 Height Weight   01/10/24 0430 (!) 162/78 -- -- 74 18 96 % -- --   01/10/24 0411 -- -- -- 70 18 96 % -- --   01/10/24 0319 -- -- -- 69 14 98 % -- --   01/10/24 0304 (!) 188/83 -- -- 73 16 96 % -- --   01/10/24 0249 -- -- -- 73 18 95 % -- --   01/10/24 0234 (!) 176/77 -- -- 69 21 96 % -- --   01/10/24 0219 -- -- -- 76 15 97 % -- --   01/10/24 0204 (!) 176/96 -- -- 69 19 96 % -- --   01/10/24 0136 -- -- -- 77 19 96 % -- --   01/10/24 0130 (!) 163/104 -- -- 72 18 97 % -- --   01/10/24 0121 -- -- -- 81 15 97 % -- --   01/10/24 0106 -- -- -- 86 18 97 % --

## 2024-01-10 NOTE — CONSULTS
brain without contrast: (Reviewed at 22:55)  Hypodensity in the right parietal lobe could be recent infarct given sulcal effacement. Multifocal small-vessel and large vessel territory encephalomalacia changes are noted.  ASPECTS score 8/10      Assessment:       Michelle Verma is a 88 y.o.  female with a history of dementia, recent CVA who presents with acute R-sided hemiplegia that had been slowly improving over the course in ED, 3/5 in both upper and lower extremities on most recent assessment. CTH is negative for hemorrhage, revealed hypodensity in the right parietal lobe that does not correspond to patient's clinical presentation.     Acute ischemic stroke vs TIA, etiology is unclear        Last Known Well (date and time)    9:30 pm      Candidate for IV Tenecteplase therapy    Yes []  Risks including 6% of sich/death, benefits of potential improved thrombolysis, and alternatives to IV thrombolytics discussed with patient and/or family.  N/A  N/A  No   [x] due to the following exclusion criteria: history of previous CVA    Candidate for Thrombectomy   Yes []    No  [] due to the following exclusion criteria:       Disposition   [x] General Neurology Care Status - prefer 1st floor (1C)   [] Internal Medicine General Care Status   [] NICU Status - (1B)     [] MICU Status   [] Observation Status    Stroke admission order set:  [] [id number] - KATHY Intercerebral Hemorrhage Admission  [] [id number] - KATHY Sub Arachnoid Hemorrhage Admission  [] [id number] - KATHY Ischemic Stroke TPA Treatment Focused  [] [id number] - IP Ischemic Stroke ICU Post Alteplase (TPA) Admission   [x] [id number] - GEN Ischemic Stroke Non-Thrombolytic Focused      Plan:       Imaging  - CTH WO: Hypodensity in the right parietal lobe could be recent infarct given sulcal effacement. Multifocal small-vessel and large vessel territory encephalomalacia changes are noted.  ASPECTS score 8/10  - MRI Brain WO: ordered   - EKG: normal sinus rhythm      Medications  - continue with ASA 81mg daily,   - Will load with Plavix 300 mg, patient is to be continued with Plavix 75mg daily for at least 21d  - Crestor 80mg nightly     Labs  - Fasting Lipid Panel: ordered, goal - less than 70  - If no recent a1c, order a1c. MH A1c DM Goal: <7.0%    Orders  - PT, OT, Speech eval, PMR c/s  - Telemetry   - Neuro checks per protocol  - We recommend SBP <200  - Blood glucose goal less than 180  - Please avoid dextrose containing solutions     - Discussed with Reggie Carlin, DO    Additional recommendations may follow    Please contact EV NSG or telestroke with any changes in patients neurologic status.           Mariama Hunt MD   1/10/2024  12:34 AM

## 2024-01-11 ENCOUNTER — APPOINTMENT (OUTPATIENT)
Age: 89
End: 2024-01-11
Payer: COMMERCIAL

## 2024-01-11 LAB
ANION GAP SERPL CALCULATED.3IONS-SCNC: 12 MMOL/L (ref 9–17)
BUN SERPL-MCNC: 26 MG/DL (ref 8–23)
CALCIUM SERPL-MCNC: 8.8 MG/DL (ref 8.6–10.4)
CHLORIDE SERPL-SCNC: 104 MMOL/L (ref 98–107)
CO2 SERPL-SCNC: 23 MMOL/L (ref 20–31)
CREAT SERPL-MCNC: 1.2 MG/DL (ref 0.5–0.9)
ECHO AO ROOT DIAM: 3.3 CM
ECHO AO ROOT INDEX: 1.93 CM/M2
ECHO AV AREA PEAK VELOCITY: 1.9 CM2
ECHO AV AREA VTI: 1.7 CM2
ECHO AV AREA/BSA PEAK VELOCITY: 1.1 CM2/M2
ECHO AV AREA/BSA VTI: 1 CM2/M2
ECHO AV MEAN GRADIENT: 8 MMHG
ECHO AV MEAN VELOCITY: 1.4 M/S
ECHO AV PEAK GRADIENT: 17 MMHG
ECHO AV PEAK VELOCITY: 2.1 M/S
ECHO AV VELOCITY RATIO: 0.62
ECHO AV VTI: 36.2 CM
ECHO BSA: 1.75 M2
ECHO BSA: 1.75 M2
ECHO LA AREA 2C: 12 CM2
ECHO LA AREA 4C: 14.1 CM2
ECHO LA DIAMETER INDEX: 2.05 CM/M2
ECHO LA DIAMETER: 3.5 CM
ECHO LA MAJOR AXIS: 4.7 CM
ECHO LA MINOR AXIS: 3.9 CM
ECHO LA TO AORTIC ROOT RATIO: 1.06
ECHO LA VOL BP: 35 ML (ref 22–52)
ECHO LA VOL MOD A2C: 31 ML (ref 22–52)
ECHO LA VOL MOD A4C: 33 ML (ref 22–52)
ECHO LA VOL/BSA BIPLANE: 20 ML/M2 (ref 16–34)
ECHO LA VOLUME INDEX MOD A2C: 18 ML/M2 (ref 16–34)
ECHO LA VOLUME INDEX MOD A4C: 19 ML/M2 (ref 16–34)
ECHO LV E' LATERAL VELOCITY: 5 CM/S
ECHO LV E' SEPTAL VELOCITY: 5 CM/S
ECHO LV EDV A2C: 34 ML
ECHO LV EDV A4C: 52 ML
ECHO LV EDV INDEX A4C: 30 ML/M2
ECHO LV EDV NDEX A2C: 20 ML/M2
ECHO LV EJECTION FRACTION A2C: 57 %
ECHO LV EJECTION FRACTION A4C: 65 %
ECHO LV EJECTION FRACTION BIPLANE: 60 % (ref 55–100)
ECHO LV ESV A2C: 15 ML
ECHO LV ESV A4C: 18 ML
ECHO LV ESV INDEX A2C: 9 ML/M2
ECHO LV ESV INDEX A4C: 11 ML/M2
ECHO LV FRACTIONAL SHORTENING: 8 % (ref 28–44)
ECHO LV INTERNAL DIMENSION DIASTOLE INDEX: 2.22 CM/M2
ECHO LV INTERNAL DIMENSION DIASTOLIC: 3.8 CM (ref 3.9–5.3)
ECHO LV INTERNAL DIMENSION SYSTOLIC INDEX: 2.05 CM/M2
ECHO LV INTERNAL DIMENSION SYSTOLIC: 3.5 CM
ECHO LV IVSD: 1.2 CM (ref 0.6–0.9)
ECHO LV MASS 2D: 143.8 G (ref 67–162)
ECHO LV MASS INDEX 2D: 84.1 G/M2 (ref 43–95)
ECHO LV POSTERIOR WALL DIASTOLIC: 1.1 CM (ref 0.6–0.9)
ECHO LV RELATIVE WALL THICKNESS RATIO: 0.58
ECHO LVOT AREA: 3.1 CM2
ECHO LVOT AV VTI INDEX: 0.56
ECHO LVOT DIAM: 2 CM
ECHO LVOT MEAN GRADIENT: 2 MMHG
ECHO LVOT PEAK GRADIENT: 6 MMHG
ECHO LVOT PEAK VELOCITY: 1.3 M/S
ECHO LVOT STROKE VOLUME INDEX: 36.9 ML/M2
ECHO LVOT SV: 63.1 ML
ECHO LVOT VTI: 20.1 CM
ECHO MV A VELOCITY: 1.29 M/S
ECHO MV AREA VTI: 2.1 CM2
ECHO MV E DECELERATION TIME (DT): 285 MS
ECHO MV E VELOCITY: 0.9 M/S
ECHO MV E/A RATIO: 0.7
ECHO MV E/E' LATERAL: 18
ECHO MV E/E' RATIO (AVERAGED): 18
ECHO MV LVOT VTI INDEX: 1.48
ECHO MV MAX VELOCITY: 1.4 M/S
ECHO MV MEAN GRADIENT: 3 MMHG
ECHO MV MEAN VELOCITY: 0.8 M/S
ECHO MV PEAK GRADIENT: 8 MMHG
ECHO MV VTI: 29.7 CM
ECHO PV MAX VELOCITY: 1.4 M/S
ECHO PV PEAK GRADIENT: 8 MMHG
ECHO RA AREA 4C: 13.3 CM2
ECHO RV BASAL DIMENSION: 3 CM
ECHO RV FREE WALL PEAK S': 19 CM/S
ECHO RV TAPSE: 2.5 CM (ref 1.7–?)
ERYTHROCYTE [DISTWIDTH] IN BLOOD BY AUTOMATED COUNT: 12.2 % (ref 11.8–14.4)
GFR SERPL CREATININE-BSD FRML MDRD: 44 ML/MIN/1.73M2
GLUCOSE SERPL-MCNC: 99 MG/DL (ref 70–99)
HCT VFR BLD AUTO: 34.6 % (ref 36.3–47.1)
HGB BLD-MCNC: 12 G/DL (ref 11.9–15.1)
MCH RBC QN AUTO: 32 PG (ref 25.2–33.5)
MCHC RBC AUTO-ENTMCNC: 34.7 G/DL (ref 28.4–34.8)
MCV RBC AUTO: 92.3 FL (ref 82.6–102.9)
NRBC BLD-RTO: 0 PER 100 WBC
PLATELET # BLD AUTO: 265 K/UL (ref 138–453)
PMV BLD AUTO: 10.7 FL (ref 8.1–13.5)
POTASSIUM SERPL-SCNC: 3.7 MMOL/L (ref 3.7–5.3)
RBC # BLD AUTO: 3.75 M/UL (ref 3.95–5.11)
SODIUM SERPL-SCNC: 139 MMOL/L (ref 135–144)
WBC OTHER # BLD: 9.3 K/UL (ref 3.5–11.3)

## 2024-01-11 PROCEDURE — 80048 BASIC METABOLIC PNL TOTAL CA: CPT

## 2024-01-11 PROCEDURE — 2060000000 HC ICU INTERMEDIATE R&B

## 2024-01-11 PROCEDURE — 97535 SELF CARE MNGMENT TRAINING: CPT

## 2024-01-11 PROCEDURE — 93246 EXT ECG>7D<15D RECORDING: CPT

## 2024-01-11 PROCEDURE — 6370000000 HC RX 637 (ALT 250 FOR IP)

## 2024-01-11 PROCEDURE — 97162 PT EVAL MOD COMPLEX 30 MIN: CPT

## 2024-01-11 PROCEDURE — 2580000003 HC RX 258

## 2024-01-11 PROCEDURE — 99221 1ST HOSP IP/OBS SF/LOW 40: CPT | Performed by: PHYSICAL MEDICINE & REHABILITATION

## 2024-01-11 PROCEDURE — 93306 TTE W/DOPPLER COMPLETE: CPT | Performed by: INTERNAL MEDICINE

## 2024-01-11 PROCEDURE — 85027 COMPLETE CBC AUTOMATED: CPT

## 2024-01-11 PROCEDURE — 93306 TTE W/DOPPLER COMPLETE: CPT

## 2024-01-11 PROCEDURE — 6370000000 HC RX 637 (ALT 250 FOR IP): Performed by: STUDENT IN AN ORGANIZED HEALTH CARE EDUCATION/TRAINING PROGRAM

## 2024-01-11 PROCEDURE — 6360000002 HC RX W HCPCS

## 2024-01-11 PROCEDURE — 36415 COLL VENOUS BLD VENIPUNCTURE: CPT

## 2024-01-11 PROCEDURE — 97530 THERAPEUTIC ACTIVITIES: CPT

## 2024-01-11 PROCEDURE — 87635 SARS-COV-2 COVID-19 AMP PRB: CPT

## 2024-01-11 PROCEDURE — 97166 OT EVAL MOD COMPLEX 45 MIN: CPT

## 2024-01-11 RX ADMIN — LISINOPRIL 10 MG: 10 TABLET ORAL at 09:22

## 2024-01-11 RX ADMIN — MEMANTINE HYDROCHLORIDE 10 MG: 5 TABLET, FILM COATED ORAL at 09:22

## 2024-01-11 RX ADMIN — CLOPIDOGREL BISULFATE 75 MG: 75 TABLET ORAL at 09:22

## 2024-01-11 RX ADMIN — SODIUM CHLORIDE, PRESERVATIVE FREE 10 ML: 5 INJECTION INTRAVENOUS at 22:02

## 2024-01-11 RX ADMIN — CLONAZEPAM 0.5 MG: 0.5 TABLET ORAL at 20:30

## 2024-01-11 RX ADMIN — Medication 5 MG: at 20:30

## 2024-01-11 RX ADMIN — MEMANTINE HYDROCHLORIDE 10 MG: 5 TABLET, FILM COATED ORAL at 20:31

## 2024-01-11 RX ADMIN — ROSUVASTATIN CALCIUM 20 MG: 10 TABLET, COATED ORAL at 20:30

## 2024-01-11 RX ADMIN — PAROXETINE HYDROCHLORIDE HEMIHYDRATE 20 MG: 20 TABLET, FILM COATED ORAL at 09:22

## 2024-01-11 RX ADMIN — ENOXAPARIN SODIUM 30 MG: 100 INJECTION SUBCUTANEOUS at 09:22

## 2024-01-11 RX ADMIN — ASPIRIN 81 MG: 81 TABLET, COATED ORAL at 09:22

## 2024-01-11 ASSESSMENT — ENCOUNTER SYMPTOMS
DIARRHEA: 0
SINUS PAIN: 0
VOMITING: 0
ABDOMINAL PAIN: 0
SINUS PRESSURE: 0
SHORTNESS OF BREATH: 0
NAUSEA: 0
SORE THROAT: 0

## 2024-01-11 NOTE — CARE COORDINATION
Case Management Assessment  Initial Evaluation    Date/Time of Evaluation: 1/11/2024 10:24 AM  Assessment Completed by: Eun Levin    If patient is discharged prior to next notation, then this note serves as note for discharge by case management.    Patient Name: Michelle Verma                   YOB: 1935  Diagnosis: Acute ischemic stroke (HCC) [I63.9]  Cerebrovascular accident (CVA), unspecified mechanism (HCC) [I63.9]                   Date / Time: 1/9/2024 10:39 PM    Patient Admission Status: Inpatient   Readmission Risk (Low < 19, Mod (19-27), High > 27): Readmission Risk Score: 11.4    Current PCP: Oliver Gilliam MD  PCP verified by CM? (P) Yes (Dr. Oliva)    Chart Reviewed: Yes      History Provided by: (P) Patient  Patient Orientation: (P) Alert and Oriented, Person, Place, Situation, Self    Patient Cognition: (P) Alert    Hospitalization in the last 30 days (Readmission):  No    If yes, Readmission Assessment in  Navigator will be completed.    Advance Directives:      Code Status: DNR-CCA   Patient's Primary Decision Maker is: (P) Patient Declined (Legal Next of Kin Remains as Decision Maker)      Discharge Planning:    Patient lives with: (P) Other (Comment) (pt plans to go back to [facility]) Type of Home: (P) Skilled Nursing Facility (pt plans to go back to [facility])  Primary Care Giver: (P) Self  Patient Support Systems include: (P) Children   Current Financial resources: (P) Medicare  Current community resources: (P) ECF/Home Care  Current services prior to admission: (P) Skilled Nursing Facility, Durable Medical Equipment            Current DME: (P) Walker            Type of Home Care services:  (P) None    ADLS  Prior functional level: (P) Assistance with the following:, Bathing, Cooking, Housework, Shopping  Current functional level: (P) Assistance with the following:, Bathing, Dressing, Toileting, Cooking, Housework, Shopping, Mobility    PT AM-PAC:   /24  OT

## 2024-01-11 NOTE — CARE COORDINATION
Transitional Plannin-  Spoke with Alla from [facility] who states they rec'd referral, will take pt when ready for discharge. Alla states she will initiate precert for when pt is ready for discharge.   1130- Per Dr. Kari Bautista, pt ready for d/c . Called and LM for Alla at [facility] to start precert today. States they will need COVID test prior to d/c . CM updated DHAVAL Galindo, states she will obtain order  1240- Josie states order for COVID test obtained and pt being swabbed now

## 2024-01-11 NOTE — PROGRESS NOTES
Occupational Therapy  Facility/Department: [address] STEPDOWN  Occupational Therapy Initial Assessment    Name: Michelle Verma  : 1935  MRN: [id number]  Date of Service: 2024    Discharge Recommendations:   Further therapy recommended at discharge.            Patient Diagnosis(es): The encounter diagnosis was Cerebrovascular accident (CVA), unspecified mechanism (HCC).  Past Medical History:  has no past medical history on file.  Past Surgical History:  has no past surgical history on file.           Assessment   Performance deficits / Impairments: Decreased safe awareness;Decreased functional mobility ;Decreased ADL status;Decreased endurance;Decreased high-level IADLs;Decreased cognition;Decreased strength;Decreased coordination  Assessment: pt demonstrated above deficits impacting occupational performance. pt would benefit from continued acute OT, as well as at discharge in order to increase safety and independence with ADLs and functional transfers/functional mobility.  Prognosis: Good  Decision Making: Medium Complexity  REQUIRES OT FOLLOW-UP: Yes  Activity Tolerance  Activity Tolerance: Patient Tolerated treatment well        Plan   Occupational Therapy Plan  Times Per Week: 3x/wk     Restrictions  Restrictions/Precautions  Restrictions/Precautions: Fall Risk, Up as Tolerated  Required Braces or Orthoses?: No  Position Activity Restriction  Other position/activity restrictions: hx dementia, admitted with R side deficits    Subjective   General  Patient assessed for rehabilitation services?: Yes  Family / Caregiver Present: Yes (pt son and daughter present for duration of session)  General Comment  Comments: RN Ok'd for therapy this afternoon.pt agreeable to participate in session and cooperative/pleasant throughout. pt denied pain     Social/Functional History  Social/Functional History  Type of Home: Assisted living  Home Layout: One level  Home Access: Level entry  Bathroom Shower/Tub: Walk-in shower

## 2024-01-11 NOTE — CONSULTS
Physical Medicine & Rehabilitation  Consult Note      Admitting Physician: Gayle Muhammad MD    Primary Care Provider: Oliver Gilliam MD     Reason for Consult:  Acute Inpatient Rehabilitation    Chief Complaint: Loss of consciousness    History of Present Illness:  Referring Provider is requesting an evaluation for appropriate placement upon discharge from acute care.     Ms. Michelle Verma is a 88 y.o. female who [facility] with Loss of Consciousness    88-year-old female admitted with loss of consciousness strokelike symptoms-has history of dementia brought by EMS from nursing facility on arrival was alert oriented x 3 cooperative had right hemiparesis slight flattening of right nasolabial fold no other neurologic deficits.  CT showed hypodensity in right parietal lobe possible recent infarct.  Noted on recent admission due to frequent falls bilateral leg weakness-MRI brain 12/7/2023 diffuse restriction left superior frontal gyrus focal area of likely acute to subacute stroke left parietal lobe and left frontal white matter, central hemorrhage with masslike appearance of left anterior body of corpus callosum which favors subacute stroke.  Patient discharged to nursing facility on daily aspirin patient loaded with Plavix    Neurology a 8-year-old female with history of recent left KOURTNEY ischemic stroke in December, right frontal meningioma and dementia and acute right-sided hemiplegia slowly improving MRI showed small acute infarct left frontal/parietal lobe-, SAE pending Holter/event monitor on discharge to rule out A-fib,    Radiology:  MRI brain without contrast    Result Date: 1/10/2024  1. Small acute infarct within the deep white matter of the left frontal parietal lobe just superior to the left basal ganglia. 2. Severe chronic microvascular disease. 3. Right frontal craniotomy. The findings were sent to the Radiology [facility] at 8:17 am on 1/10/2024 to  tablet 4 mg, 4 mg, Oral, Q8H PRN **OR** ondansetron (ZOFRAN) injection 4 mg, 4 mg, IntraVENous, Q6H PRN  polyethylene glycol (GLYCOLAX) packet 17 g, 17 g, Oral, Daily PRN  enoxaparin Sodium (LOVENOX) injection 30 mg, 30 mg, SubCUTAneous, Daily  clopidogrel (PLAVIX) tablet 75 mg, 75 mg, Oral, Daily  aspirin EC tablet 81 mg, 81 mg, Oral, Daily  labetalol (NORMODYNE;TRANDATE) injection 10 mg, 10 mg, IntraVENous, Q4H PRN  memantine (NAMENDA) tablet 10 mg, 10 mg, Oral, BID  rosuvastatin (CRESTOR) tablet 20 mg, 20 mg, Oral, Nightly  lisinopril (PRINIVIL;ZESTRIL) tablet 10 mg, 10 mg, Oral, Daily  PARoxetine (PAXIL) tablet 20 mg, 20 mg, Oral, Daily  clonazePAM (KLONOPIN) tablet 0.5 mg, 0.5 mg, Oral, Q12H PRN  melatonin tablet 5 mg, 5 mg, Oral, Nightly  labetalol (NORMODYNE;TRANDATE) injection 10 mg, 10 mg, IntraVENous, Once    Social History:  Social History     Socioeconomic History    Marital status:      Spouse name: Not on file    Number of children: Not on file    Years of education: Not on file    Highest education level: Not on file   Occupational History    Not on file   Tobacco Use    Smoking status: Not on file    Smokeless tobacco: Not on file   Substance and Sexual Activity    Alcohol use: Not on file    Drug use: Not on file    Sexual activity: Not on file   Other Topics Concern    Not on file   Social History Narrative    Not on file     Social Determinants of Health     Financial Resource Strain: Not on file   Food Insecurity: Not on file   Transportation Needs: Not on file   Physical Activity: Not on file   Stress: Not on file   Social Connections: Not on file   Intimate Partner Violence: Not on file   Housing Stability: Not on file     Currently came  from skilled nurse facility    Family History:   No family history on file.        Physical Exam:    BP (!) 146/58   Pulse 72   Temp 98.6 °F (37 °C) (Oral)   Resp 19   Ht 1.575 m (5' 2\")   Wt 70 kg (154 lb 5.2 oz)   SpO2 95%   BMI 28.23 kg/m²

## 2024-01-11 NOTE — PLAN OF CARE
Problem: Safety - Adult  Goal: Free from fall injury  Outcome: Progressing  Flowsheets (Taken 1/11/2024 0753)  Free From Fall Injury: Instruct family/caregiver on patient safety     Problem: Discharge Planning  Goal: Discharge to home or other facility with appropriate resources  Outcome: Progressing  Flowsheets (Taken 1/11/2024 0800)  Discharge to home or other facility with appropriate resources:   Identify barriers to discharge with patient and caregiver   Arrange for needed discharge resources and transportation as appropriate   Identify discharge learning needs (meds, wound care, etc)   Refer to discharge planning if patient needs post-hospital services based on physician order or complex needs related to functional status, cognitive ability or social support system     Problem: Skin/Tissue Integrity  Goal: Absence of new skin breakdown  Description: 1.  Monitor for areas of redness and/or skin breakdown  2.  Assess vascular access sites hourly  3.  Every 4-6 hours minimum:  Change oxygen saturation probe site  4.  Every 4-6 hours:  If on nasal continuous positive airway pressure, respiratory therapy assess nares and determine need for appliance change or resting period.  Outcome: Progressing     Problem: ABCDS Injury Assessment  Goal: Absence of physical injury  Outcome: Progressing  Flowsheets (Taken 1/11/2024 0753)  Absence of Physical Injury: Implement safety measures based on patient assessment     Problem: Chronic Conditions and Co-morbidities  Goal: Patient's chronic conditions and co-morbidity symptoms are monitored and maintained or improved  Outcome: Progressing  Flowsheets (Taken 1/11/2024 0800)  Care Plan - Patient's Chronic Conditions and Co-Morbidity Symptoms are Monitored and Maintained or Improved:   Monitor and assess patient's chronic conditions and comorbid symptoms for stability, deterioration, or improvement   Collaborate with multidisciplinary team to address chronic and comorbid

## 2024-01-11 NOTE — PROGRESS NOTES
Physical Therapy  Facility/Department: [address] STE[address]OWN  Physical Therapy Initial Assessment    Name: Michelle Verma  : 1935  MRN: [id number]  Date of Service: 2024    Chief Complaint   Patient presents with    Loss of Consciousness       Discharge Recommendations: Further therapy recommended at discharge.      PT Equipment Recommendations  Equipment Needed: No (pt owns RW)      Patient Diagnosis(es): The encounter diagnosis was Cerebrovascular accident (CVA), unspecified mechanism (HCC).  Past Medical History:  has no past medical history on file.  Past Surgical History:  has no past surgical history on file.    Assessment   Body Structures, Functions, Activity Limitations Requiring Skilled Therapeutic Intervention: Decreased functional mobility ;Decreased endurance;Decreased strength;Decreased safe awareness;Decreased balance;Decreased cognition  Assessment: The pt performed functional transfers with CGA and ambulated 70ft + 70ft with RW and CGA-Min A. Recommend continued therapy to address deficits and progress toward baseline level of functional independence.  Therapy Prognosis: Good  Decision Making: Medium Complexity  Requires PT Follow-Up: Yes  Activity Tolerance  Activity Tolerance: Patient limited by endurance     Plan   Physical Therapy Plan  General Plan:  (5-6x/wk)  Current Treatment Recommendations: Strengthening, ROM, Balance training, Functional mobility training, Transfer training, Endurance training, Gait training, Stair training, Home exercise program, Safety education & training, Patient/Caregiver education & training, Therapeutic activities  Safety Devices  Type of Devices: Nurse notified, Gait belt, Call light within reach, Left in bed, Bed alarm in place  Restraints  Restraints Initially in Place: No     Restrictions  Restrictions/Precautions  Restrictions/Precautions: Fall Risk, Up as Tolerated  Required Braces or Orthoses?: No  Position Activity Restriction  Other position/activity  hospital room?: A Little  How much help is needed climbing 3-5 steps with a railing?: A Lot  AM-PAC Inpatient Mobility Raw Score : 17  AM-PAC Inpatient T-Scale Score : 42.13  Mobility Inpatient CMS 0-100% Score: 50.57  Mobility Inpatient CMS G-Code Modifier : CK       Goals  Short Term Goals  Time Frame for Short Term Goals: 14 visits  Short Term Goal 1: Perform bed mobility and functional transfers independently  Short Term Goal 2: Ambulate 300ft with RW and supervision  Short Term Goal 3: Demo Good- dynamic standing balance with RW to decrease risk of falls  Short Term Goal 4: Perform standing HEP with supervision       Education  Patient Education  Education Given To: Patient;Family  Education Provided: Role of Therapy;Plan of Care;Transfer Training;Fall Prevention Strategies  Education Method: Verbal;Demonstration  Barriers to Learning: Cognition  Education Outcome: Verbalized understanding;Continued education needed      Therapy Time   Individual Concurrent Group Co-treatment   Time In 1334         Time Out 1407         Minutes 33         Timed Code Treatment Minutes: 8 Minutes; co-eval with OT       Josie Man, PT

## 2024-01-11 NOTE — PROGRESS NOTES
[facility] Neurology   IN-PATIENT SERVICE  General Neurology Progress Note   [facility]                Date:   1/11/2024  Patient name:  Michelle Verma  Date of admission:  1/9/2024 10:39 PM  MRN:   [id number]  Account:  [id number]  YOB: 1935  PCP:    Oliver Gilliam MD  Room:   [address] [place] [address]  Code Status:    DNR-CCA  Chief Complaint:   Chief Complaint   Patient presents with    Loss of Consciousness       Interval history      The patient was seen and examined at bedside this morning.  Right-sided strength in both arm and leg are improving.  Patient is able to hold against gravity for 10 and 5 seconds for arm and leg respectively.  Still some flattening of right nasolabial fold.    Labs, chart, and VS reviewed. No acute events overnight.     Brief History     The patient is a 88 y.o.  Non- / non  female who presents with Loss of Consciousness   and she is admitted to the hospital for the management of  stroke-like symptoms     The patient is a 88 y.o. female with PMH of dementia who was brought in by EMS from the nursing facility as RACE alert after witnessed fall due to acute onset right-sided weakness.    Upon initial assessment immediatly after arrival patient is alert and oriented x 3, cooperative, was given NIH SS of 9 for R sided hemiparesis and slight flattening of  R nasolabial fold, no other neurological deficit including numbness, gaze preference, hemineglect, aphasia or ataxia.     CT head reported with hypodensity in the right parietal lobe could be recent infarct given sulcal effacement that does not correspond to patient's neurological exam findings.     Upon medical chart review patient had recent admission due to frequent falls and bilateral leg weakness, MRI brain 12/07/2023 was obtained and reported \"diffusion restriction of the left superior frontal gyrus with associated gyriform enhancement and increased T1 signal favored to represent

## 2024-01-11 NOTE — PLAN OF CARE
Problem: Safety - Adult  Goal: Free from fall injury  1/10/2024 2202 by Luis Antonio Ramsey RN  Outcome: Progressing  1/10/2024 1830 by Ciarra Bruce RN  Outcome: Progressing  Flowsheets (Taken 1/10/2024 1805)  Free From Fall Injury: Instruct family/caregiver on patient safety     Problem: Discharge Planning  Goal: Discharge to home or other facility with appropriate resources  1/10/2024 2202 by Luis Antonio Ramsey RN  Outcome: Progressing  1/10/2024 1830 by Ciarra Bruce RN  Outcome: Progressing  Flowsheets (Taken 1/10/2024 1800)  Discharge to home or other facility with appropriate resources:   Identify barriers to discharge with patient and caregiver   Arrange for needed discharge resources and transportation as appropriate   Identify discharge learning needs (meds, wound care, etc)   Arrange for interpreters to assist at discharge as needed   Refer to discharge planning if patient needs post-hospital services based on physician order or complex needs related to functional status, cognitive ability or social support system     Problem: Skin/Tissue Integrity  Goal: Absence of new skin breakdown  Description: 1.  Monitor for areas of redness and/or skin breakdown  2.  Assess vascular access sites hourly  3.  Every 4-6 hours minimum:  Change oxygen saturation probe site  4.  Every 4-6 hours:  If on nasal continuous positive airway pressure, respiratory therapy assess nares and determine need for appliance change or resting period.  1/10/2024 2202 by Luis Antonio Ramsey RN  Outcome: Progressing  1/10/2024 1830 by Ciarra Bruce, RN  Outcome: Progressing     Problem: ABCDS Injury Assessment  Goal: Absence of physical injury  1/10/2024 2202 by Luis Antonio Ramsey RN  Outcome: Progressing  1/10/2024 1830 by Ciarra Bruce, RN  Outcome: Progressing

## 2024-01-11 NOTE — DISCHARGE INSTR - COC
Continuity of Care Form    Patient Name: Michelle Verma   :  1935  MRN:  [id number]    Admit date:  2024  Discharge date:  ***    Code Status Order: DNR-CCA   Advance Directives:     Admitting Physician:  Gayle Muhammad MD  PCP: Oliver Gilliam MD    Discharging Nurse: ***  Discharging Hospital Unit/Room#: 0138/0138-01  Discharging Unit Phone Number: ***    Emergency Contact:   Extended Emergency Contact Information  Primary Emergency Contact: Brianda Salazar  Home Phone: [phone]  Relation: Child  Secondary Emergency Contact: Maurizio Perez  Home Phone: [phone]  Relation: Child    Past Surgical History:  No past surgical history on file.    Immunization History:   Immunization History   Administered Date(s) Administered    COVID-19, MODERNA BLUE border, Primary or Immunocompromised, (age 12y+), IM, 100 mcg/0.5mL 2021, 2021       Active Problems:  Patient Active Problem List   Diagnosis Code    Cerebrovascular accident (CVA) ([facility]) I63.9    Encounter for palliative care Z51.5    ACP (advance care planning) Z71.89       Isolation/Infection:   Isolation            No Isolation          Patient Infection Status       None to display            Nurse Assessment:  Last Vital Signs: BP (!) 146/58   Pulse 79   Temp 98.6 °F (37 °C) (Oral)   Resp 19   Ht 1.575 m (5' 2\")   Wt 69.9 kg (154 lb)   SpO2 95%   BMI 28.17 kg/m²     Last documented pain score (0-10 scale):    Last Weight:   Wt Readings from Last 1 Encounters:   24 69.9 kg (154 lb)     Mental Status:  oriented and alert    IV Access:  - None    Nursing Mobility/ADLs:  Walking   Assisted  Transfer  Assisted  Bathing  Assisted  Dressing  Assisted  Toileting  Assisted  Feeding  Assisted  Med Admin  Assisted  Med Delivery   whole    Wound Care Documentation and Therapy:        Elimination:  Continence:   Bowel: Yes  Bladder: Yes  Urinary Catheter: None   Colostomy/Ileostomy/Ileal Conduit: No       Date of Last BM:

## 2024-01-11 NOTE — CARE COORDINATION
Social work is deferring PHQ 9 assessment as pt has a history of dementia and will not be able to participate in the assessment accurately.

## 2024-01-12 VITALS
WEIGHT: 154 LBS | HEIGHT: 62 IN | HEART RATE: 70 BPM | RESPIRATION RATE: 16 BRPM | TEMPERATURE: 97.6 F | BODY MASS INDEX: 28.34 KG/M2 | DIASTOLIC BLOOD PRESSURE: 67 MMHG | SYSTOLIC BLOOD PRESSURE: 152 MMHG | OXYGEN SATURATION: 96 %

## 2024-01-12 LAB
ANION GAP SERPL CALCULATED.3IONS-SCNC: 12 MMOL/L (ref 9–17)
BUN SERPL-MCNC: 33 MG/DL (ref 8–23)
CALCIUM SERPL-MCNC: 8.6 MG/DL (ref 8.6–10.4)
CHLORIDE SERPL-SCNC: 105 MMOL/L (ref 98–107)
CO2 SERPL-SCNC: 22 MMOL/L (ref 20–31)
CREAT SERPL-MCNC: 1.4 MG/DL (ref 0.5–0.9)
ERYTHROCYTE [DISTWIDTH] IN BLOOD BY AUTOMATED COUNT: 12.5 % (ref 11.8–14.4)
GFR SERPL CREATININE-BSD FRML MDRD: 37 ML/MIN/1.73M2
GLUCOSE SERPL-MCNC: 95 MG/DL (ref 70–99)
HCT VFR BLD AUTO: 34.4 % (ref 36.3–47.1)
HGB BLD-MCNC: 11.6 G/DL (ref 11.9–15.1)
MCH RBC QN AUTO: 31.7 PG (ref 25.2–33.5)
MCHC RBC AUTO-ENTMCNC: 33.7 G/DL (ref 28.4–34.8)
MCV RBC AUTO: 94 FL (ref 82.6–102.9)
NRBC BLD-RTO: 0 PER 100 WBC
PLATELET # BLD AUTO: 237 K/UL (ref 138–453)
PMV BLD AUTO: 10.6 FL (ref 8.1–13.5)
POTASSIUM SERPL-SCNC: 3.8 MMOL/L (ref 3.7–5.3)
RBC # BLD AUTO: 3.66 M/UL (ref 3.95–5.11)
SARS-COV-2 RDRP RESP QL NAA+PROBE: NORMAL
SODIUM SERPL-SCNC: 139 MMOL/L (ref 135–144)
SPECIMEN DESCRIPTION: NORMAL
WBC OTHER # BLD: 10.2 K/UL (ref 3.5–11.3)

## 2024-01-12 PROCEDURE — 94761 N-INVAS EAR/PLS OXIMETRY MLT: CPT

## 2024-01-12 PROCEDURE — 97129 THER IVNTJ 1ST 15 MIN: CPT

## 2024-01-12 PROCEDURE — 6360000002 HC RX W HCPCS

## 2024-01-12 PROCEDURE — 97130 THER IVNTJ EA ADDL 15 MIN: CPT

## 2024-01-12 PROCEDURE — 85027 COMPLETE CBC AUTOMATED: CPT

## 2024-01-12 PROCEDURE — 99239 HOSP IP/OBS DSCHRG MGMT >30: CPT | Performed by: PSYCHIATRY & NEUROLOGY

## 2024-01-12 PROCEDURE — 80048 BASIC METABOLIC PNL TOTAL CA: CPT

## 2024-01-12 PROCEDURE — 36415 COLL VENOUS BLD VENIPUNCTURE: CPT

## 2024-01-12 PROCEDURE — 6370000000 HC RX 637 (ALT 250 FOR IP)

## 2024-01-12 PROCEDURE — 2580000003 HC RX 258

## 2024-01-12 RX ORDER — ATORVASTATIN CALCIUM 80 MG/1
80 TABLET, FILM COATED ORAL NIGHTLY
Status: DISCONTINUED | OUTPATIENT
Start: 2024-01-12 | End: 2024-01-12 | Stop reason: HOSPADM

## 2024-01-12 RX ORDER — ROSUVASTATIN CALCIUM 10 MG/1
10 TABLET, COATED ORAL NIGHTLY
Status: DISCONTINUED | OUTPATIENT
Start: 2024-01-12 | End: 2024-01-12

## 2024-01-12 RX ORDER — MEMANTINE HYDROCHLORIDE 5 MG/1
5 TABLET ORAL 2 TIMES DAILY
Status: DISCONTINUED | OUTPATIENT
Start: 2024-01-12 | End: 2024-01-12 | Stop reason: HOSPADM

## 2024-01-12 RX ADMIN — ASPIRIN 81 MG: 81 TABLET, COATED ORAL at 10:42

## 2024-01-12 RX ADMIN — SODIUM CHLORIDE, PRESERVATIVE FREE 10 ML: 5 INJECTION INTRAVENOUS at 10:43

## 2024-01-12 RX ADMIN — MEMANTINE HYDROCHLORIDE 10 MG: 5 TABLET, FILM COATED ORAL at 10:44

## 2024-01-12 RX ADMIN — LISINOPRIL 10 MG: 10 TABLET ORAL at 10:42

## 2024-01-12 RX ADMIN — ENOXAPARIN SODIUM 30 MG: 100 INJECTION SUBCUTANEOUS at 10:41

## 2024-01-12 RX ADMIN — PAROXETINE HYDROCHLORIDE HEMIHYDRATE 20 MG: 20 TABLET, FILM COATED ORAL at 10:43

## 2024-01-12 RX ADMIN — CLOPIDOGREL BISULFATE 75 MG: 75 TABLET ORAL at 10:43

## 2024-01-12 ASSESSMENT — ENCOUNTER SYMPTOMS
DIARRHEA: 0
SORE THROAT: 0
SINUS PRESSURE: 0
ABDOMINAL PAIN: 0
SINUS PAIN: 0
VOMITING: 0
NAUSEA: 0
SHORTNESS OF BREATH: 0

## 2024-01-12 NOTE — PROGRESS NOTES
Pharmacy Note     Renal Dose Adjustment    Michelle Verma is a 88 y.o. female. Pharmacist assessment of renally cleared medications.    Recent Labs     01/11/24  0232 01/12/24  0521   BUN 26* 33*       Recent Labs     01/11/24  0232 01/12/24  0521   CREATININE 1.2* 1.4*       Estimated Creatinine Clearance: 26 mL/min (A) (based on SCr of 1.4 mg/dL (H)).    Height:   Ht Readings from Last 1 Encounters:   01/11/24 1.575 m (5' 2\")     Weight:  Wt Readings from Last 1 Encounters:   01/11/24 69.9 kg (154 lb)       The following medication dose has been adjusted based upon renal function per P&T Guidelines:             Memantine 10 mg BID --> Memantine 5 mg BID  Rosuvastatin 20 mg nightly --> Rosuvastatin 10 mg nightly     Christiano Lutz, OmarD, BCCCP 1/12/2024 11:21 AM

## 2024-01-12 NOTE — CARE COORDINATION
Transitional Planning  Auth obtained.  Spoke with Ed at [facility], transportation arranged for 3 pm.  Called [facility] admissions, notified of transportation time. Update to patient, RN Sharon and son Maurizio.     Report number [phone] ask for [facility]. Fax [phone].      No HENS needed.      1250 pm Faxed MEENU to number provided.      Discharge Report    [facility]  Clinical Case Management Department  Written by: Nori Calderon RN    Patient Name: Michelle Verma  Attending Provider: Tanya Washington DO  Admit Date: 2024 10:39 PM  MRN: [id number]  Account: [id number]                     : 1935  Discharge Date:       Disposition: [facility]     Nori Calderon RN

## 2024-01-12 NOTE — CARE COORDINATION
Transitional Plannin- LM with Alla at [facility] to check status on precert, awaiting call back   Alla from [facility] called to say pt approved can come anytime today. Transport packet completed and fxd to [facility].   1110-2nd IMM given to pt and she agrees to Discharge back to [facility]   1153-PS sent o Fam chandler for MEENU to be completed, RN to also completed MEENU   Pt to be p/u at 3p, called and LM for daughter and pt updated. Discharge Report    [facility]  Clinical Case Management Department  Written by: Eun Levin    Patient Name: Michelle REESE Verma  Attending Provider: No att. providers found  Admit Date: 2024 10:39 PM  MRN: [id number]  Account: [id number]                     : 1935  Discharge Date: 2024      Disposition: [facility]    Enu Levin

## 2024-01-12 NOTE — PLAN OF CARE
Problem: Safety - Adult  Goal: Free from fall injury  1/12/2024 1504 by Lida Ferguson RN  Outcome: Progressing  1/12/2024 0741 by James Licona RN  Outcome: Progressing  Flowsheets (Taken 1/11/2024 1941)  Free From Fall Injury: Instruct family/caregiver on patient safety     Problem: Discharge Planning  Goal: Discharge to home or other facility with appropriate resources  1/12/2024 1504 by Lida Ferguson RN  Outcome: Progressing  Flowsheets (Taken 1/12/2024 0800)  Discharge to home or other facility with appropriate resources: Identify barriers to discharge with patient and caregiver  1/12/2024 0741 by James Licona RN  Outcome: Progressing  Flowsheets (Taken 1/11/2024 2000)  Discharge to home or other facility with appropriate resources: Identify barriers to discharge with patient and caregiver     Problem: Skin/Tissue Integrity  Goal: Absence of new skin breakdown  Description: 1.  Monitor for areas of redness and/or skin breakdown  2.  Assess vascular access sites hourly  3.  Every 4-6 hours minimum:  Change oxygen saturation probe site  4.  Every 4-6 hours:  If on nasal continuous positive airway pressure, respiratory therapy assess nares and determine need for appliance change or resting period.  1/12/2024 1504 by Lida Ferguson RN  Outcome: Progressing  1/12/2024 0741 by James Licona RN  Outcome: Progressing     Problem: ABCDS Injury Assessment  Goal: Absence of physical injury  1/12/2024 1504 by Lida Ferguson RN  Outcome: Progressing  1/12/2024 0741 by James Licona RN  Outcome: Progressing  Flowsheets (Taken 1/11/2024 1941)  Absence of Physical Injury: Implement safety measures based on patient assessment     Problem: Chronic Conditions and Co-morbidities  Goal: Patient's chronic conditions and co-morbidity symptoms are monitored and maintained or improved  1/12/2024 1504 by Lida Ferguson RN  Outcome: Progressing  Flowsheets (Taken 1/12/2024 0800)  Care Plan - Patient's

## 2024-01-12 NOTE — PLAN OF CARE
Problem: Safety - Adult  Goal: Free from fall injury  1/12/2024 0741 by James Licona RN  Outcome: Progressing  Flowsheets (Taken 1/11/2024 1941)  Free From Fall Injury: Instruct family/caregiver on patient safety     Problem: Discharge Planning  Goal: Discharge to home or other facility with appropriate resources  1/12/2024 0741 by James Licona, RN  Outcome: Progressing  Flowsheets (Taken 1/11/2024 2000)  Discharge to home or other facility with appropriate resources: Identify barriers to discharge with patient and caregiver     Problem: Skin/Tissue Integrity  Goal: Absence of new skin breakdown  Description: 1.  Monitor for areas of redness and/or skin breakdown  2.  Assess vascular access sites hourly  3.  Every 4-6 hours minimum:  Change oxygen saturation probe site  4.  Every 4-6 hours:  If on nasal continuous positive airway pressure, respiratory therapy assess nares and determine need for appliance change or resting period.  1/12/2024 0741 by James Licona RN  Outcome: Progressing     Problem: ABCDS Injury Assessment  Goal: Absence of physical injury  1/12/2024 0741 by James Licona RN  Outcome: Progressing  Flowsheets (Taken 1/11/2024 1941)  Absence of Physical Injury: Implement safety measures based on patient assessment     Problem: Chronic Conditions and Co-morbidities  Goal: Patient's chronic conditions and co-morbidity symptoms are monitored and maintained or improved  1/12/2024 0741 by James Licona, RN  Outcome: Progressing  Flowsheets (Taken 1/11/2024 2000)  Care Plan - Patient's Chronic Conditions and Co-Morbidity Symptoms are Monitored and Maintained or Improved: Monitor and assess patient's chronic conditions and comorbid symptoms for stability, deterioration, or improvement

## 2024-01-12 NOTE — PROGRESS NOTES
Speech Language Pathology  [facility]    Cognitive Treatment Note    Date: 1/12/2024  Patient’s Name: Michelle Verma  MRN: [id number]  Patient Active Problem List   Diagnosis Code    Cerebrovascular accident (CVA) (HCC) I63.9    Encounter for palliative care Z51.5    ACP (advance care planning) Z71.89       Pain: 0/10    Cognitive Treatment    Treatment time: 11:05-11:30      Subjective: [x] Alert [x] Cooperative     [] Confused     [] Agitated    [] Lethargic      Objective/Assessment:    Recall: short-term recall of 3 units-related: 0/3 increased to 1/3 with moderate verbal cues, 0/3 increased to 2/3 with moderate verbal cues, 3/3 independently.    Organization: Category members: [place]: 5/8 increased to 8/8 with minimal cues, 5/8 increased to 8/8 with minimal cues, 4/8 increased to 8/8 with minimal cues.     Problem Solving/Reasoning: Word Deductions: 5/9 increased to 9/9 with minimal-moderate cues.    Stating Situational Problems: 2/2 independently, 2/2 independently, 1/2 independently, 2/2 independently, 1/2 independently increased to 2/2 with minimal cues.     Multiple Uses for Objects: 7/8 independently, increased to 8/8 with minimal cues.     Other: Pt. Provided with education regarding memory compensatory strategies. Pt. Encouraged to utilize strategies once discharged from the hospital. Pt. Verbalized understanding.  Tip sheep left at bedside.    Plan:  [x] Continue ST services    [] Discharge from ST:      Discharge recommendations: []  Further therapy recommended at discharge.The patient should be able to tolerate at least 3 hours of therapy per day over 5 days or 15 hours over 7 days. [x] Further therapy recommended at discharge.   [] No therapy recommended at discharge.          Treatment completed by: Anne Mcleod,   Treatment cosigned by Samantha Muhammad M.A., CCC-SLP

## 2024-01-12 NOTE — DISCHARGE SUMMARY
[facility]     Department of Neurology    INPATIENT DISCHARGE SUMMARY        Patient Identification:  Michelle Verma is a 88 y.o. female.  :  1935  MRN: [id number]     Acct: [id number]   Admit Date:  2024  Discharge date and time: 2024  3:11 PM   Attending Provider: No att. providers found                                     Admission Diagnoses:   Acute ischemic stroke (HCC) [I63.9]  Cerebrovascular accident (CVA), unspecified mechanism (HCC) [I63.9]    Discharge Diagnoses:   Principal Problem:    Cerebrovascular accident (CVA) (HCC)  Active Problems:    Encounter for palliative care    ACP (advance care planning)  Resolved Problems:    * No resolved hospital problems. *       Consults:   neurology    Brief Inpatient course:    Michelle Verma is a 88 y.o. female with a history of remote right sided CVA, recent left KOURTNEY ischemic stroke in December, right frontal meningioma, and dementia who presented with acute right-sided hemiplegia. MRI showed small acute infarct in the deep white matter on the left frontal/parietal lobe just superior to the basal ganglia. Etiology is unclear. Patient was on DAPT prior to this presentation; consequently, there were no medication changes made during this admission. The patient had steady improvement in her exam since  admission with greatly improved right arm and leg strength. Patient had echocardiogram which did not show any atrial thrombus. Patient was discharged home with Holter monitor to rule out Afib.      Patient is stable from neurological standpoint to be discharged.    Procedures:  None    Any Hospital Acquired Infections: none    Discharge Functional Status:  stable    Disposition: SNF    Patient Instructions:   Continue on dual antiplatelet therapy and nightly statin. Outpatient follow up wi      Discharge Medications:  There are no discharge medications for this patient.      Activity: activity as tolerated    Restrictions: No

## 2024-01-12 NOTE — PROGRESS NOTES
[facility] Neurology   IN-PATIENT SERVICE  General Neurology Progress Note   [facility]                Date:   1/12/2024  Patient name:  Michelle Verma  Date of admission:  1/9/2024 10:39 PM  MRN:   [id number]  Account:  [id number]  YOB: 1935  PCP:    Oliver Gilliam MD  Room:   [address] [place] [address]  Code Status:    DNR-CCA  Chief Complaint:   Chief Complaint   Patient presents with    Loss of Consciousness       Interval history      The patient was seen and examined at bedside this morning.  No acute events overnight.   Right-sided strength in both arm and leg continues to improve.  Still some flattening of right nasolabial fold.    No cardiac thrombus noted in SAE.    Labs, chart, and VS reviewed.    Pending placement, pre-cert started 1/11/24.    Brief History     The patient is a 88 y.o.  Non- / non  female who presents with Loss of Consciousness   and she is admitted to the hospital for the management of  stroke-like symptoms     The patient is a 88 y.o. female with PMH of dementia who was brought in by EMS from the nursing facility as RACE alert after witnessed fall due to acute onset right-sided weakness.    Upon initial assessment immediatly after arrival patient is alert and oriented x 3, cooperative, was given NIH SS of 9 for R sided hemiparesis and slight flattening of  R nasolabial fold, no other neurological deficit including numbness, gaze preference, hemineglect, aphasia or ataxia.     CT head reported with hypodensity in the right parietal lobe could be recent infarct given sulcal effacement that does not correspond to patient's neurological exam findings.     Upon medical chart review patient had recent admission due to frequent falls and bilateral leg weakness, MRI brain 12/07/2023 was obtained and reported \"diffusion restriction of the left superior frontal gyrus with associated gyriform enhancement and increased T1 signal favored to  Patient: Kristen Singh    : 1935 Attending:Pascale Griffith MD   82 year old female        Chief complaint:   Chest pain  INITIAL Eval/Consult/HPI:  Ms. Singh is an 82-year-old white female with a past medical history of acute kidney injury on stage V chronic kidney disease status post NSTEMI and recently discharged from [facility] on 2017 at which time she underwent a cardiac catheterization on August 3, 2017 and require dialysis initiating dialysis on 2017. She was felt to have progressed end-stage renal disease and was discharged home and continues chronic hemodialysis at the [facility] in [place]. She dialyzes on a  basis and was stopped last dialyzed yesterday. 1 hour into dialysis she began to experience chest pain. Dialysis was terminated and she was sent initially to [facility] in [place] and then later transferred here to [facility] for further cardiology evaluation of her acute coronary syndrome. During her last hospitalization at [facility] PTCA was attempted on her circumflex, but was unsuccessful. She was discharged home on medical management. We are asked to evaluate her renal failure and continued need for dialysis. At this time she is feeling better. She denies shortness of breath. She denies chest pain.      Subjective: Complains of fatigue, but otherwise feeling well. No chest pain. No nausea.  Past Medical History:   Diagnosis Date   • Anemia     secondary to CKD    • Arthritis    • Chronic kidney disease    • Essential (primary) hypertension    • Gastroesophageal reflux disease    • High cholesterol    • Pneumonia    • Thyroid condition     hyperparathyroidism     • Thyroid nodule    • Urinary tract infection        Past Surgical History:   Procedure Laterality Date   • JOINT REPLACEMENT Right     hip   • REMOVAL GALLBLADDER     • SERVICE TO GASTROENTEROLOGY  2013    EGD    • TOTAL HIP REPLACEMENT Right  01/2017       ALLERGIES:   Allergen Reactions   • Penicillins SWELLING   • Nsaids Other (See Comments)     Due to Chronic Kidney Disease   • Sulfa Antibiotics SWELLING   • Tape [Adhesive] Dermatitis       Medications/Infusions:  Scheduled:   • isosorbide mononitrate  30 mg Oral Daily   • amLODIPine  5 mg Oral Daily   • metoPROLOL  100 mg Oral 2 times per day   • aspirin  81 mg Oral Daily   • atorvastatin  80 mg Oral Nightly   • pantoprazole  40 mg Oral QAM AC   • polyethylene glycol  17 g Oral Daily   • hydrALAZINE  100 mg Oral TID   • allopurinol  100 mg Oral Daily   • bumetanide  1 mg Oral BID   • [START ON 8/21/2017] calcitRIOL  0.25 mcg Oral Once per day on Mon   • ferrous sulfate  325 mg Oral Daily with breakfast   • clopidogrel  75 mg Oral Daily   • vitamin B-12  1,000 mcg Oral Daily   • sodium chloride (PF)  2 mL Injection 2 times per day   • heparin (porcine)  5,000 Units Subcutaneous 3 times per day   • cholecalciferol  5,000 Units Oral Daily       Continuous Infusions:      Social History     Social History   • Marital status:      Spouse name: N/A   • Number of children: N/A   • Years of education: N/A     Occupational History   • Not on file.     Social History Main Topics   • Smoking status: Former Smoker     Packs/day: 1.00     Years: 58.00     Types: Cigarettes     Quit date: 4/17/2015   • Smokeless tobacco: Never Used   • Alcohol use No   • Drug use: No   • Sexual activity: Not on file     Other Topics Concern   • Not on file     Social History Narrative   • No narrative on file       Family History   Problem Relation Age of Onset   • Cancer Mother    • Cancer Father    • Cancer Sister    • Diabetes Sister    • Cancer Brother    • Diabetes Brother    • Stroke Brother    • Thyroid Sister    • Thyroid Brother    • Diabetes Brother    • Hypertension Son    • Stroke Maternal Grandmother        ROS: 12 point ROS reviewed and unremarkable except for what was elicited in the H and P      Vital Last  Value 24 Hour Range   Temperature 97.8 °F (36.6 °C) Temp  Min: 97.3 °F (36.3 °C)  Max: 98.6 °F (37 °C)   Pulse 64 Pulse  Min: 62  Max: 74   Respiratory 18 Resp  Min: 14  Max: 20   Blood Pressure 156/63 BP  Min: 122/75  Max: 176/65   Art BP   No Data Recorded   Pulse Oximetry 98 % SpO2  Min: 94 %  Max: 99 %     Vital Today Admitted   Weight 64 kg Weight: 64 kg   Height N/A Height: 5' 2\" (157.5 cm)   BMI N/A BMI (Calculated): 25.86     Weight over the past 48 Hours:  Patient Vitals for the past 48 hrs:   Weight   08/16/17 1656 64 kg   08/17/17 1645 62.5 kg   08/18/17 0600 64 kg        Hemodynamics:      Last Value 24 Hour Range   CVP   No Data Recorded   PAS/PAD   No Data Recorded   PCWP   No Data Recorded   CO   No Data Recorded   CI   No Data Recorded   SVR   No Data Recorded   SV02   No Data Recorded     Intake/Output:    Last Stool Occurrence: 1 (08/17/17 1200)    I/O this shift:  In: 100 [P.O.:100]  Out: -     I/O last 3 completed shifts:  In: 960 [P.O.:960]  Out: 2400 [Urine:900; Other:1500]      Intake/Output Summary (Last 24 hours) at 08/18/17 1108  Last data filed at 08/18/17 0934   Gross per 24 hour   Intake              940 ml   Output             1800 ml   Net             -860 ml         Physical Exam:  GENERAL: ALERT ORIENTED X 3, NOT IN RESPIRATORY DISTRESS, APPROPRIATE MOOD AND AFFECT  HEENT: NORMOCEPHALLIC ATRAUMATIC,PERRL, NO PALLOR, NO ICTERUS, MUCOUS MEMBRANE MOIST, NO LESIONS  NECK: SUPPLE, NO ELEVATED JVD, NO CERVICAL AND NO SUPRACLAVICULAR LYMPHADENOPATHY, NO GOITER, NO MASS, TRACHEA MIDLINE  CHEST: SYMMETRIC AIRENTRY, CLEAR TO AUSCULATATION BILATERAL  HEART: REGULAR RATE & RHYTHM,  S1, S2, NO RUB. Systolic murmur at the left sternal border.  ABDOMEN: SOFT, NON TENDER, NO DISTENTION, NO HEPATOSPLENOMEGALY, BS PRESENT  EXTREMITIES: NO  CYANOSIS, NOCLUBBING, NO EDEMA. Good bruit and thrill in left forearm arterial venous graft.  NEUROMUSCULAR: MOVES ALL EXTREMITIES, GROSSLY INTACT, NON-FOCAL  SKIN:  NO RASH, NO LESIONS, NO NODULES      Laboratory Results:  Lab Results   Component Value Date    URIC 7.6 (H) 04/30/2014     Lab Results   Component Value Date    INR 1.0 12/28/2016     (HH) 08/03/2017    VB12 953 (H) 01/29/2015    INTAC 112 (H) 08/03/2017    PST 18 08/02/2017    FERR 57 08/02/2017       Urine Panel  Lab Results   Component Value Date    UCL 97 05/01/2014    JAKI 88 05/01/2014    UKET NEGATIVE 07/31/2017    UPROT TRACE (A) 07/31/2017    UWBC SMALL (A) 07/31/2017    URBC NEGATIVE 07/31/2017    UNITR NEGATIVE 07/31/2017    UBILI NEGATIVE 07/31/2017    UPH 7.0 07/31/2017    USPG 1.010 07/31/2017    UBACTR FEW (A) 07/31/2017       Recent Labs      08/17/17   0429  08/18/17   0442   SODIUM  140  141   POTASSIUM  3.4  3.3*   CHLORIDE  100  101   CO2  32  32   ANIONGAP  11  11   BUN  35*  25*   CREATININE  3.51*  2.68*   GLUCOSE  89  86   WBC  7.4  7.4   HGB  7.5*  7.9*   HCT  24.8*  25.1*   PLT  334  356         Diagnosis/Plan:  1) End-stage renal disease/stage VI chronic kidney disease: Last dialyzed yesterday. Typically dialyzes on a Monday Wednesday Friday basis. Should follow-up on Monday in the [facility] in [place] following discharge to continue chronic hemodialysis per her usual routine. If not discharged today, we'll plan to dialyze as an inpatient tomorrow.  2) Chest pain consistent with acute coronary syndrome/coronary artery disease: On medical management.  3) Multifactorial anemia: Should resume BRE in the outpatient dialysis Center in [place] following discharge.  4) Hypokalemia: Supplement potassium.  5) Hypertension: Blood pressure appears well-controlled. Observe closely on current regimen for now.

## 2024-01-15 LAB
SARS-COV-2 RNA RESP QL NAA+PROBE: NOT DETECTED
SOURCE: NORMAL

## 2024-01-16 ENCOUNTER — HOSPITAL ENCOUNTER (EMERGENCY)
Age: 89
Discharge: HOME | End: 2024-01-16
Attending: STUDENT IN AN ORGANIZED HEALTH CARE EDUCATION/TRAINING PROGRAM
Payer: COMMERCIAL

## 2024-01-16 ENCOUNTER — APPOINTMENT (OUTPATIENT)
Dept: CT IMAGING | Age: 89
End: 2024-01-16
Payer: COMMERCIAL

## 2024-01-16 ENCOUNTER — APPOINTMENT (OUTPATIENT)
Dept: GENERAL RADIOLOGY | Age: 89
End: 2024-01-16
Payer: COMMERCIAL

## 2024-01-16 VITALS
SYSTOLIC BLOOD PRESSURE: 184 MMHG | WEIGHT: 154.6 LBS | BODY MASS INDEX: 28.45 KG/M2 | DIASTOLIC BLOOD PRESSURE: 83 MMHG | RESPIRATION RATE: 16 BRPM | OXYGEN SATURATION: 96 % | TEMPERATURE: 98.3 F | HEART RATE: 78 BPM | HEIGHT: 62 IN
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
## 2024-01-16 DIAGNOSIS — S01.81XA FACIAL LACERATION, INITIAL ENCOUNTER: ICD-10-CM

## 2024-01-16 PROCEDURE — 73110 X-RAY EXAM OF WRIST: CPT

## 2024-01-16 PROCEDURE — 90715 TDAP VACCINE 7 YRS/> IM: CPT | Performed by: STUDENT IN AN ORGANIZED HEALTH CARE EDUCATION/TRAINING PROGRAM

## 2024-01-16 PROCEDURE — 6360000002 HC RX W HCPCS: Performed by: STUDENT IN AN ORGANIZED HEALTH CARE EDUCATION/TRAINING PROGRAM

## 2024-01-16 PROCEDURE — 70450 CT HEAD/BRAIN W/O DYE: CPT

## 2024-01-16 PROCEDURE — 12013 RPR F/E/E/N/L/M 2.6-5.0 CM: CPT

## 2024-01-16 PROCEDURE — 2500000003 HC RX 250 WO HCPCS: Performed by: STUDENT IN AN ORGANIZED HEALTH CARE EDUCATION/TRAINING PROGRAM

## 2024-01-16 PROCEDURE — 99284 EMERGENCY DEPT VISIT MOD MDM: CPT

## 2024-01-16 PROCEDURE — 90471 IMMUNIZATION ADMIN: CPT | Performed by: STUDENT IN AN ORGANIZED HEALTH CARE EDUCATION/TRAINING PROGRAM

## 2024-01-16 RX ORDER — CLONAZEPAM 0.5 MG/1
0.5 TABLET ORAL 2 TIMES DAILY PRN
COMMUNITY

## 2024-01-16 RX ORDER — PAROXETINE HYDROCHLORIDE 20 MG/1
20 TABLET, FILM COATED ORAL EVERY MORNING
COMMUNITY

## 2024-01-16 RX ORDER — ASPIRIN 81 MG/1
81 TABLET, CHEWABLE ORAL DAILY
COMMUNITY

## 2024-01-16 RX ORDER — SENNA AND DOCUSATE SODIUM 50; 8.6 MG/1; MG/1
1 TABLET, FILM COATED ORAL DAILY
COMMUNITY

## 2024-01-16 RX ORDER — MULTIVIT WITH MINERALS/LUTEIN
1 TABLET ORAL DAILY
COMMUNITY

## 2024-01-16 RX ORDER — LISINOPRIL 10 MG/1
10 TABLET ORAL DAILY
COMMUNITY

## 2024-01-16 RX ORDER — ATORVASTATIN CALCIUM 40 MG/1
40 TABLET, FILM COATED ORAL DAILY
COMMUNITY

## 2024-01-16 RX ORDER — MEMANTINE HYDROCHLORIDE 10 MG/1
10 TABLET ORAL 2 TIMES DAILY
COMMUNITY

## 2024-01-16 RX ORDER — ACETAMINOPHEN 325 MG/1
650 TABLET ORAL EVERY 6 HOURS PRN
COMMUNITY

## 2024-01-16 RX ORDER — LIDOCAINE HYDROCHLORIDE AND EPINEPHRINE 10; 10 MG/ML; UG/ML
20 INJECTION, SOLUTION INFILTRATION; PERINEURAL ONCE
Status: COMPLETED | OUTPATIENT
Start: 2024-01-16 | End: 2024-01-16

## 2024-01-16 RX ADMIN — LIDOCAINE HYDROCHLORIDE,EPINEPHRINE BITARTRATE 20 ML: 10; .01 INJECTION, SOLUTION INFILTRATION; PERINEURAL at 06:25

## 2024-01-16 RX ADMIN — TETANUS TOXOID, REDUCED DIPHTHERIA TOXOID AND ACELLULAR PERTUSSIS VACCINE, ADSORBED 0.5 ML: 5; 2.5; 8; 8; 2.5 SUSPENSION INTRAMUSCULAR at 06:06

## 2024-01-16 ASSESSMENT — PAIN DESCRIPTION - ORIENTATION: ORIENTATION: RIGHT

## 2024-01-16 ASSESSMENT — ENCOUNTER SYMPTOMS
ABDOMINAL PAIN: 0
SHORTNESS OF BREATH: 0
COUGH: 0
VOMITING: 0
NAUSEA: 0

## 2024-01-16 ASSESSMENT — PAIN - FUNCTIONAL ASSESSMENT: PAIN_FUNCTIONAL_ASSESSMENT: 0-10

## 2024-01-16 ASSESSMENT — PAIN DESCRIPTION - LOCATION: LOCATION: HAND

## 2024-01-16 ASSESSMENT — PAIN SCALES - GENERAL: PAINLEVEL_OUTOF10: 7

## 2024-01-16 ASSESSMENT — PAIN DESCRIPTION - DESCRIPTORS: DESCRIPTORS: ACHING

## 2024-01-16 NOTE — ED NOTES
Pt assisted to bed side commode. Pt states she has trouble lifting her right leg. Daughter states that is from her previous CVA and pt is receiving PT/OT at the SNF. Pt remains alert and oriented. Pt has no other requests at this time.

## 2024-01-16 NOTE — DISCHARGE INSTRUCTIONS
Stitches need to be removed in 5 to 7 days  Please continue take all medication as prescribed  May utilize a warming pad or heating pack over the bruise on your face which may help with the bruising   Would recommend using antibiotic ointment over the stitches and an antibiotic ointment over the skin tear on your right forearm  If you notice any spreading redness, purulent discharge, pain, fevers, chills, or any other concerning symptoms around the wounds or for you, return to the ER immediately

## 2024-01-16 NOTE — ED PROVIDER NOTES
understanding of how to correctly take their medications and the possible side effects.    PATIENT REFERRED TO:  Oliver Gilliam MD  [address] [facility] [address]  [place] [address]  [phone]    Schedule an appointment as soon as possible for a visit in 5 days  For Follow Up, For suture removal      DISCHARGE MEDICATIONS:  New Prescriptions    No medications on file       Jabari Ding DO  Emergency Medicine Attending    (Please note that portions of this note were completed with a voice recognition program.  Efforts were made to edit the dictations but occasionally words are mis-transcribed.)          Jabari Ding,   01/16/24 0601       Jabari Ding DO  01/16/24 0602

## 2024-02-07 ENCOUNTER — HOSPITAL ENCOUNTER (OUTPATIENT)
Age: 89
Setting detail: SPECIMEN
Discharge: HOME | End: 2024-02-07

## 2024-02-07 LAB
AFP SERPL-MCNC: 3.2 UG/L
ANION GAP SERPL CALCULATED.3IONS-SCNC: 10 MMOL/L (ref 9–17)
BUN SERPL-MCNC: 30 MG/DL (ref 8–23)
CALCIUM SERPL-MCNC: 8.9 MG/DL (ref 8.6–10.4)
CHLORIDE SERPL-SCNC: 103 MMOL/L (ref 98–107)
CO2 SERPL-SCNC: 26 MMOL/L (ref 20–31)
CREAT SERPL-MCNC: 1.3 MG/DL (ref 0.5–0.9)
ERYTHROCYTE [DISTWIDTH] IN BLOOD BY AUTOMATED COUNT: 12.8 % (ref 11.8–14.4)
GFR SERPL CREATININE-BSD FRML MDRD: 40 ML/MIN/1.73M2
GLUCOSE SERPL-MCNC: 84 MG/DL (ref 70–99)
HCT VFR BLD AUTO: 36 % (ref 36.3–47.1)
HGB BLD-MCNC: 12.2 G/DL (ref 11.9–15.1)
MCH RBC QN AUTO: 32 PG (ref 25.2–33.5)
MCHC RBC AUTO-ENTMCNC: 33.9 G/DL (ref 28.4–34.8)
MCV RBC AUTO: 94.5 FL (ref 82.6–102.9)
NRBC BLD-RTO: 0 PER 100 WBC
PLATELET # BLD AUTO: 259 K/UL (ref 138–453)
PMV BLD AUTO: 10.8 FL (ref 8.1–13.5)
POTASSIUM SERPL-SCNC: 4 MMOL/L (ref 3.7–5.3)
RBC # BLD AUTO: 3.81 M/UL (ref 3.95–5.11)
SODIUM SERPL-SCNC: 139 MMOL/L (ref 135–144)
WBC OTHER # BLD: 6 K/UL (ref 3.5–11.3)

## 2024-02-07 PROCEDURE — 82105 ALPHA-FETOPROTEIN SERUM: CPT

## 2024-02-07 PROCEDURE — P9603 ONE-WAY ALLOW PRORATED MILES: HCPCS

## 2024-02-07 PROCEDURE — 36415 COLL VENOUS BLD VENIPUNCTURE: CPT

## 2024-02-07 PROCEDURE — 85027 COMPLETE CBC AUTOMATED: CPT

## 2024-02-07 PROCEDURE — 80048 BASIC METABOLIC PNL TOTAL CA: CPT

## 2024-02-12 LAB — ECHO BSA: 1.75 M2

## 2024-03-06 ENCOUNTER — HOSPITAL ENCOUNTER (OUTPATIENT)
Age: 89
Setting detail: SPECIMEN
Discharge: HOME | End: 2024-03-06
Payer: COMMERCIAL

## 2024-03-06 LAB
AFP SERPL-MCNC: 3.9 UG/L
ANION GAP SERPL CALCULATED.3IONS-SCNC: 14 MMOL/L (ref 9–16)
BUN SERPL-MCNC: 32 MG/DL (ref 8–23)
CALCIUM SERPL-MCNC: 9.1 MG/DL (ref 8.6–10.4)
CHLORIDE SERPL-SCNC: 103 MMOL/L (ref 98–107)
CO2 SERPL-SCNC: 25 MMOL/L (ref 20–31)
CREAT SERPL-MCNC: 1.3 MG/DL (ref 0.5–0.9)
ERYTHROCYTE [DISTWIDTH] IN BLOOD BY AUTOMATED COUNT: 12.3 % (ref 11.8–14.4)
GFR SERPL CREATININE-BSD FRML MDRD: 40 ML/MIN/1.73M2
GLUCOSE SERPL-MCNC: 93 MG/DL (ref 74–99)
HCT VFR BLD AUTO: 40.2 % (ref 36.3–47.1)
HGB BLD-MCNC: 13.7 G/DL (ref 11.9–15.1)
MCH RBC QN AUTO: 31.4 PG (ref 25.2–33.5)
MCHC RBC AUTO-ENTMCNC: 34.1 G/DL (ref 28.4–34.8)
MCV RBC AUTO: 92.2 FL (ref 82.6–102.9)
NRBC BLD-RTO: 0 PER 100 WBC
PLATELET # BLD AUTO: 304 K/UL (ref 138–453)
PMV BLD AUTO: 11 FL (ref 8.1–13.5)
POTASSIUM SERPL-SCNC: 3.8 MMOL/L (ref 3.7–5.3)
RBC # BLD AUTO: 4.36 M/UL (ref 3.95–5.11)
SODIUM SERPL-SCNC: 142 MMOL/L (ref 136–145)
WBC OTHER # BLD: 5.7 K/UL (ref 3.5–11.3)

## 2024-03-06 PROCEDURE — 36415 COLL VENOUS BLD VENIPUNCTURE: CPT

## 2024-03-06 PROCEDURE — P9603 ONE-WAY ALLOW PRORATED MILES: HCPCS

## 2024-03-06 PROCEDURE — 82105 ALPHA-FETOPROTEIN SERUM: CPT

## 2024-03-06 PROCEDURE — 85027 COMPLETE CBC AUTOMATED: CPT

## 2024-03-06 PROCEDURE — 80048 BASIC METABOLIC PNL TOTAL CA: CPT

## 2024-04-10 ENCOUNTER — HOSPITAL ENCOUNTER (OUTPATIENT)
Age: 89
Setting detail: SPECIMEN
Discharge: HOME | End: 2024-04-10
Payer: COMMERCIAL

## 2024-04-10 LAB
AFP SERPL-MCNC: 3.3 UG/L
ANION GAP SERPL CALCULATED.3IONS-SCNC: 10 MMOL/L (ref 9–16)
BUN SERPL-MCNC: 31 MG/DL (ref 8–23)
CALCIUM SERPL-MCNC: 8.5 MG/DL (ref 8.6–10.4)
CHLORIDE SERPL-SCNC: 109 MMOL/L (ref 98–107)
CO2 SERPL-SCNC: 23 MMOL/L (ref 20–31)
CREAT SERPL-MCNC: 1.4 MG/DL (ref 0.5–0.9)
ERYTHROCYTE [DISTWIDTH] IN BLOOD BY AUTOMATED COUNT: 12.5 % (ref 11.8–14.4)
GFR SERPL CREATININE-BSD FRML MDRD: 37 ML/MIN/1.73M2
GLUCOSE SERPL-MCNC: 94 MG/DL (ref 74–99)
HCT VFR BLD AUTO: 35.7 % (ref 36.3–47.1)
HGB BLD-MCNC: 12.2 G/DL (ref 11.9–15.1)
MCH RBC QN AUTO: 31.1 PG (ref 25.2–33.5)
MCHC RBC AUTO-ENTMCNC: 34.2 G/DL (ref 28.4–34.8)
MCV RBC AUTO: 91.1 FL (ref 82.6–102.9)
NRBC BLD-RTO: 0 PER 100 WBC
PLATELET # BLD AUTO: 228 K/UL (ref 138–453)
PMV BLD AUTO: 11.1 FL (ref 8.1–13.5)
POTASSIUM SERPL-SCNC: 4.5 MMOL/L (ref 3.7–5.3)
RBC # BLD AUTO: 3.92 M/UL (ref 3.95–5.11)
SODIUM SERPL-SCNC: 142 MMOL/L (ref 136–145)
WBC OTHER # BLD: 6.2 K/UL (ref 3.5–11.3)

## 2024-04-10 PROCEDURE — 36415 COLL VENOUS BLD VENIPUNCTURE: CPT

## 2024-04-10 PROCEDURE — 85027 COMPLETE CBC AUTOMATED: CPT

## 2024-04-10 PROCEDURE — 82105 ALPHA-FETOPROTEIN SERUM: CPT

## 2024-04-10 PROCEDURE — P9603 ONE-WAY ALLOW PRORATED MILES: HCPCS

## 2024-04-10 PROCEDURE — 80048 BASIC METABOLIC PNL TOTAL CA: CPT

## 2024-05-01 ENCOUNTER — HOSPITAL ENCOUNTER (OUTPATIENT)
Age: 89
Setting detail: SPECIMEN
Discharge: HOME | End: 2024-05-01
Payer: COMMERCIAL

## 2024-05-01 LAB
ALBUMIN SERPL-MCNC: 4.5 G/DL (ref 3.5–5.2)
ALBUMIN/GLOB SERPL: 2 {RATIO} (ref 1–2.5)
ALP SERPL-CCNC: 105 U/L (ref 35–104)
ALT SERPL-CCNC: 12 U/L (ref 10–35)
ANION GAP SERPL CALCULATED.3IONS-SCNC: 12 MMOL/L (ref 9–16)
AST SERPL-CCNC: 26 U/L (ref 10–35)
BASOPHILS # BLD: 0.08 K/UL (ref 0–0.2)
BASOPHILS NFR BLD: 1 % (ref 0–2)
BILIRUB SERPL-MCNC: 0.3 MG/DL (ref 0–1.2)
BUN SERPL-MCNC: 34 MG/DL (ref 8–23)
CALCIUM SERPL-MCNC: 9 MG/DL (ref 8.6–10.4)
CHLORIDE SERPL-SCNC: 106 MMOL/L (ref 98–107)
CO2 SERPL-SCNC: 23 MMOL/L (ref 20–31)
CREAT SERPL-MCNC: 1.4 MG/DL (ref 0.5–0.9)
EOSINOPHIL # BLD: 0.27 K/UL (ref 0–0.44)
EOSINOPHILS RELATIVE PERCENT: 5 % (ref 1–4)
ERYTHROCYTE [DISTWIDTH] IN BLOOD BY AUTOMATED COUNT: 12.5 % (ref 11.8–14.4)
GFR, ESTIMATED: 37 ML/MIN/1.73M2
GLUCOSE SERPL-MCNC: 119 MG/DL (ref 74–99)
HCT VFR BLD AUTO: 38.3 % (ref 36.3–47.1)
HGB BLD-MCNC: 13.1 G/DL (ref 11.9–15.1)
IMM GRANULOCYTES # BLD AUTO: <0.03 K/UL (ref 0–0.3)
IMM GRANULOCYTES NFR BLD: 0 %
LYMPHOCYTES NFR BLD: 2.18 K/UL (ref 1.1–3.7)
LYMPHOCYTES RELATIVE PERCENT: 36 % (ref 24–43)
MCH RBC QN AUTO: 30.6 PG (ref 25.2–33.5)
MCHC RBC AUTO-ENTMCNC: 34.2 G/DL (ref 28.4–34.8)
MCV RBC AUTO: 89.5 FL (ref 82.6–102.9)
MONOCYTES NFR BLD: 0.56 K/UL (ref 0.1–1.2)
MONOCYTES NFR BLD: 9 % (ref 3–12)
NEUTROPHILS NFR BLD: 49 % (ref 36–65)
NEUTS SEG NFR BLD: 2.92 K/UL (ref 1.5–8.1)
NRBC BLD-RTO: 0 PER 100 WBC
PLATELET # BLD AUTO: 249 K/UL (ref 138–453)
PMV BLD AUTO: 11.1 FL (ref 8.1–13.5)
POTASSIUM SERPL-SCNC: 4.3 MMOL/L (ref 3.7–5.3)
PROT SERPL-MCNC: 7.1 G/DL (ref 6.6–8.7)
RBC # BLD AUTO: 4.28 M/UL (ref 3.95–5.11)
SODIUM SERPL-SCNC: 141 MMOL/L (ref 136–145)
WBC OTHER # BLD: 6 K/UL (ref 3.5–11.3)

## 2024-05-01 PROCEDURE — 80053 COMPREHEN METABOLIC PANEL: CPT

## 2024-05-01 PROCEDURE — 85025 COMPLETE CBC W/AUTO DIFF WBC: CPT

## 2024-05-01 PROCEDURE — P9603 ONE-WAY ALLOW PRORATED MILES: HCPCS

## 2024-05-01 PROCEDURE — 36415 COLL VENOUS BLD VENIPUNCTURE: CPT

## 2024-07-10 ENCOUNTER — HOSPITAL ENCOUNTER (OUTPATIENT)
Age: 89
Setting detail: SPECIMEN
Discharge: HOME | End: 2024-07-10

## 2024-10-09 ENCOUNTER — HOSPITAL ENCOUNTER (OUTPATIENT)
Age: 89
Setting detail: SPECIMEN
Discharge: HOME | End: 2024-10-09

## 2025-01-09 ENCOUNTER — HOSPITAL ENCOUNTER (OUTPATIENT)
Age: 89
Setting detail: SPECIMEN
Discharge: HOME | End: 2025-01-09
Payer: COMMERCIAL

## 2025-01-09 LAB
BACTERIA URNS QL MICRO: ABNORMAL
BILIRUB UR QL STRIP: NEGATIVE
CLARITY UR: CLEAR
COLOR UR: YELLOW
EPI CELLS #/AREA URNS HPF: ABNORMAL /HPF (ref 0–5)
GLUCOSE UR STRIP-MCNC: NEGATIVE MG/DL
HGB UR QL STRIP.AUTO: ABNORMAL
KETONES UR STRIP-MCNC: NEGATIVE MG/DL
LEUKOCYTE ESTERASE UR QL STRIP: ABNORMAL
MUCOUS THREADS URNS QL MICRO: ABNORMAL
NITRITE UR QL STRIP: NEGATIVE
PH UR STRIP: 5.5 [PH] (ref 5–8)
PROT UR STRIP-MCNC: ABNORMAL MG/DL
RBC #/AREA URNS HPF: ABNORMAL /HPF (ref 0–2)
SP GR UR STRIP: 1.02 (ref 1–1.03)
UROBILINOGEN UR STRIP-ACNC: NORMAL EU/DL (ref 0–1)
WBC #/AREA URNS HPF: ABNORMAL /HPF (ref 0–5)

## 2025-01-09 PROCEDURE — 87086 URINE CULTURE/COLONY COUNT: CPT

## 2025-01-09 PROCEDURE — 81001 URINALYSIS AUTO W/SCOPE: CPT

## 2025-01-10 LAB
MICROORGANISM SPEC CULT: NORMAL
SERVICE CMNT-IMP: NORMAL
SPECIMEN DESCRIPTION: NORMAL

## 2025-01-15 ENCOUNTER — HOSPITAL ENCOUNTER (OUTPATIENT)
Age: 89
Setting detail: SPECIMEN
Discharge: HOME | End: 2025-01-15
Payer: COMMERCIAL

## 2025-01-15 LAB
AMMONIA PLAS-SCNC: 20 UMOL/L (ref 11–51)
ANION GAP SERPL CALCULATED.3IONS-SCNC: 14 MMOL/L (ref 9–16)
BASOPHILS # BLD: 0 K/UL (ref 0–0.2)
BASOPHILS NFR BLD: 0 % (ref 0–2)
BUN SERPL-MCNC: 50 MG/DL (ref 8–23)
CALCIUM SERPL-MCNC: 8.8 MG/DL (ref 8.6–10.4)
CHLORIDE SERPL-SCNC: 102 MMOL/L (ref 98–107)
CO2 SERPL-SCNC: 23 MMOL/L (ref 20–31)
CREAT SERPL-MCNC: 1.5 MG/DL (ref 0.6–0.9)
EOSINOPHIL # BLD: 0 K/UL (ref 0–0.4)
EOSINOPHILS RELATIVE PERCENT: 0 % (ref 1–4)
ERYTHROCYTE [DISTWIDTH] IN BLOOD BY AUTOMATED COUNT: 12.9 % (ref 11.8–14.4)
GFR, ESTIMATED: 34 ML/MIN/1.73M2
GLUCOSE SERPL-MCNC: 137 MG/DL (ref 74–99)
HCT VFR BLD AUTO: 31.9 % (ref 36.3–47.1)
HGB BLD-MCNC: 11.2 G/DL (ref 11.9–15.1)
IMM GRANULOCYTES # BLD AUTO: 0.35 K/UL (ref 0–0.3)
IMM GRANULOCYTES NFR BLD: 2 %
LYMPHOCYTES NFR BLD: 1.38 K/UL (ref 1–4.8)
LYMPHOCYTES RELATIVE PERCENT: 8 % (ref 24–44)
MAGNESIUM SERPL-MCNC: 2.2 MG/DL (ref 1.6–2.4)
MCH RBC QN AUTO: 30.1 PG (ref 25.2–33.5)
MCHC RBC AUTO-ENTMCNC: 35.1 G/DL (ref 28.4–34.8)
MCV RBC AUTO: 85.8 FL (ref 82.6–102.9)
MONOCYTES NFR BLD: 1.9 K/UL (ref 0.1–0.8)
MONOCYTES NFR BLD: 11 % (ref 1–7)
MORPHOLOGY: NORMAL
NEUTROPHILS NFR BLD: 79 % (ref 36–66)
NEUTS SEG NFR BLD: 13.67 K/UL (ref 1.8–7.7)
NRBC BLD-RTO: 0 PER 100 WBC
PLATELET # BLD AUTO: 338 K/UL (ref 138–453)
PMV BLD AUTO: 11.5 FL (ref 8.1–13.5)
POTASSIUM SERPL-SCNC: 3.5 MMOL/L (ref 3.7–5.3)
RBC # BLD AUTO: 3.72 M/UL (ref 3.95–5.11)
SODIUM SERPL-SCNC: 139 MMOL/L (ref 136–145)
WBC OTHER # BLD: 17.3 K/UL (ref 3.5–11.3)

## 2025-01-15 PROCEDURE — 83735 ASSAY OF MAGNESIUM: CPT

## 2025-01-15 PROCEDURE — 85025 COMPLETE CBC W/AUTO DIFF WBC: CPT

## 2025-01-15 PROCEDURE — 82140 ASSAY OF AMMONIA: CPT

## 2025-01-15 PROCEDURE — 36415 COLL VENOUS BLD VENIPUNCTURE: CPT

## 2025-01-15 PROCEDURE — 80048 BASIC METABOLIC PNL TOTAL CA: CPT

## 2025-01-15 PROCEDURE — P9603 ONE-WAY ALLOW PRORATED MILES: HCPCS

## 2025-01-28 ENCOUNTER — HOSPITAL ENCOUNTER (OUTPATIENT)
Age: 89
Setting detail: SPECIMEN
Discharge: HOME | End: 2025-01-28

## 2025-01-28 LAB
ANION GAP SERPL CALCULATED.3IONS-SCNC: 13 MMOL/L (ref 9–16)
BUN SERPL-MCNC: 36 MG/DL (ref 8–23)
CALCIUM SERPL-MCNC: 9 MG/DL (ref 8.6–10.4)
CHLORIDE SERPL-SCNC: 103 MMOL/L (ref 98–107)
CO2 SERPL-SCNC: 20 MMOL/L (ref 20–31)
CREAT SERPL-MCNC: 2.2 MG/DL (ref 0.6–0.9)
ERYTHROCYTE [DISTWIDTH] IN BLOOD BY AUTOMATED COUNT: 14.1 % (ref 11.8–14.4)
GFR, ESTIMATED: 21 ML/MIN/1.73M2
GLUCOSE SERPL-MCNC: 116 MG/DL (ref 74–99)
HCT VFR BLD AUTO: 36 % (ref 36.3–47.1)
HGB BLD-MCNC: 11.6 G/DL (ref 11.9–15.1)
MAGNESIUM SERPL-MCNC: 2.1 MG/DL (ref 1.6–2.4)
MCH RBC QN AUTO: 30.2 PG (ref 25.2–33.5)
MCHC RBC AUTO-ENTMCNC: 32.2 G/DL (ref 28.4–34.8)
MCV RBC AUTO: 93.8 FL (ref 82.6–102.9)
NRBC BLD-RTO: 0 PER 100 WBC
PLATELET # BLD AUTO: 431 K/UL (ref 138–453)
PMV BLD AUTO: 10.6 FL (ref 8.1–13.5)
POTASSIUM SERPL-SCNC: 5.6 MMOL/L (ref 3.7–5.3)
RBC # BLD AUTO: 3.84 M/UL (ref 3.95–5.11)
SODIUM SERPL-SCNC: 136 MMOL/L (ref 136–145)
WBC OTHER # BLD: 7 K/UL (ref 3.5–11.3)

## 2025-01-28 PROCEDURE — 80048 BASIC METABOLIC PNL TOTAL CA: CPT

## 2025-01-28 PROCEDURE — 36415 COLL VENOUS BLD VENIPUNCTURE: CPT

## 2025-01-28 PROCEDURE — 83735 ASSAY OF MAGNESIUM: CPT

## 2025-01-28 PROCEDURE — 85027 COMPLETE CBC AUTOMATED: CPT

## 2025-01-28 PROCEDURE — P9603 ONE-WAY ALLOW PRORATED MILES: HCPCS

## 2025-01-29 ENCOUNTER — HOSPITAL ENCOUNTER (OUTPATIENT)
Age: 89
Setting detail: SPECIMEN
Discharge: HOME | End: 2025-01-29

## 2025-02-03 PROBLEM — J31.0 CHRONIC RHINITIS: Status: ACTIVE | Noted: 2019-09-12

## 2025-02-03 PROBLEM — I10 ESSENTIAL HYPERTENSION: Status: ACTIVE | Noted: 2017-04-05

## 2025-02-03 PROBLEM — G25.0 ESSENTIAL TREMOR: Status: ACTIVE | Noted: 2021-08-17

## 2025-02-03 PROBLEM — G25.2 CONTINUOUS TREMOR: Status: ACTIVE | Noted: 2017-04-05

## 2025-02-03 PROBLEM — R40.4 TRANSIENT ALTERATION OF AWARENESS: Status: ACTIVE | Noted: 2021-03-16

## 2025-02-03 PROBLEM — R73.01 IMPAIRED FASTING GLUCOSE: Status: ACTIVE | Noted: 2017-04-05

## 2025-02-03 PROBLEM — R41.3 MEMORY LOSS: Status: ACTIVE | Noted: 2021-08-17

## 2025-02-03 PROBLEM — Z86.73 HISTORY OF STROKE: Status: ACTIVE | Noted: 2024-04-23

## 2025-02-03 PROBLEM — D33.2 BRAIN TUMOR (BENIGN) (HCC): Status: ACTIVE | Noted: 2021-11-01

## 2025-02-03 PROBLEM — R26.0 TITUBATION: Status: ACTIVE | Noted: 2023-05-04

## 2025-02-03 PROBLEM — R49.8 VOCAL TREMOR: Status: ACTIVE | Noted: 2025-02-03

## 2025-02-03 PROBLEM — M54.2 NECK PAIN: Status: ACTIVE | Noted: 2017-12-04

## 2025-02-03 PROBLEM — F41.9 ANXIETY: Status: ACTIVE | Noted: 2017-04-05

## 2025-02-03 PROBLEM — M77.8 TENDINITIS OF SHOULDER: Status: ACTIVE | Noted: 2017-04-05

## 2025-02-03 PROBLEM — I67.82 TEMPORARY CEREBRAL VASCULAR DYSFUNCTION: Status: ACTIVE | Noted: 2017-04-05

## 2025-02-03 PROBLEM — I63.9 CEREBRAL INFARCTION, LEFT HEMISPHERE (HCC): Status: ACTIVE | Noted: 2023-12-07

## 2025-02-03 PROBLEM — N39.41 URGE INCONTINENCE OF URINE: Status: ACTIVE | Noted: 2021-01-26

## 2025-02-03 PROBLEM — M62.82 RHABDOMYOLYSIS: Status: ACTIVE | Noted: 2023-12-07

## 2025-02-03 PROBLEM — F33.42 RECURRENT MAJOR DEPRESSIVE DISORDER, IN FULL REMISSION: Status: ACTIVE | Noted: 2022-05-24

## 2025-02-03 PROBLEM — N18.4 CKD (CHRONIC KIDNEY DISEASE), STAGE IV (HCC): Status: ACTIVE | Noted: 2025-02-03

## 2025-02-03 PROBLEM — G93.40 ENCEPHALOPATHY: Status: ACTIVE | Noted: 2023-12-07

## 2025-02-03 PROBLEM — R44.3 HALLUCINATIONS: Status: ACTIVE | Noted: 2021-08-17

## 2025-02-03 PROBLEM — D17.0 LIPOMA OF NECK: Status: ACTIVE | Noted: 2017-12-04

## 2025-02-03 PROBLEM — K59.00 CONSTIPATION: Status: ACTIVE | Noted: 2017-04-05

## 2025-02-03 PROBLEM — E78.2 MIXED HYPERLIPIDEMIA: Status: ACTIVE | Noted: 2017-04-05

## 2025-02-03 PROBLEM — R29.6 FREQUENT FALLS: Status: ACTIVE | Noted: 2023-12-06

## 2025-02-10 ENCOUNTER — OFFICE VISIT (OUTPATIENT)
Dept: UROLOGY | Age: 89
End: 2025-02-10
Payer: COMMERCIAL

## 2025-02-10 VITALS
DIASTOLIC BLOOD PRESSURE: 83 MMHG | WEIGHT: 154 LBS | SYSTOLIC BLOOD PRESSURE: 129 MMHG | HEIGHT: 62 IN | OXYGEN SATURATION: 95 % | HEART RATE: 71 BPM | BODY MASS INDEX: 28.34 KG/M2 | TEMPERATURE: 97 F

## 2025-02-10 DIAGNOSIS — N39.41 URGE INCONTINENCE: ICD-10-CM

## 2025-02-10 DIAGNOSIS — N20.0 KIDNEY STONE: ICD-10-CM

## 2025-02-10 DIAGNOSIS — R31.0 GROSS HEMATURIA: Primary | ICD-10-CM

## 2025-02-10 PROCEDURE — 1036F TOBACCO NON-USER: CPT | Performed by: UROLOGY

## 2025-02-10 PROCEDURE — 0509F URINE INCON PLAN DOCD: CPT | Performed by: UROLOGY

## 2025-02-10 PROCEDURE — G8427 DOCREV CUR MEDS BY ELIG CLIN: HCPCS | Performed by: UROLOGY

## 2025-02-10 PROCEDURE — G8419 CALC BMI OUT NRM PARAM NOF/U: HCPCS | Performed by: UROLOGY

## 2025-02-10 PROCEDURE — 99204 OFFICE O/P NEW MOD 45 MIN: CPT | Performed by: UROLOGY

## 2025-02-10 PROCEDURE — 1090F PRES/ABSN URINE INCON ASSESS: CPT | Performed by: UROLOGY

## 2025-02-10 PROCEDURE — 1124F ACP DISCUSS-NO DSCNMKR DOCD: CPT | Performed by: UROLOGY

## 2025-02-10 PROCEDURE — 1159F MED LIST DOCD IN RCRD: CPT | Performed by: UROLOGY

## 2025-02-10 ASSESSMENT — ENCOUNTER SYMPTOMS
CONSTIPATION: 0
EYE PAIN: 0
DIARRHEA: 0
SHORTNESS OF BREATH: 0
WHEEZING: 0
NAUSEA: 0
EYE REDNESS: 0
VOMITING: 0
COUGH: 0
ABDOMINAL PAIN: 0
BACK PAIN: 0

## 2025-02-10 NOTE — PROGRESS NOTES
Review of Systems   Constitutional:  Negative for chills, fatigue and fever.   Eyes:  Negative for pain, redness and visual disturbance.   Respiratory:  Negative for cough, shortness of breath and wheezing.    Cardiovascular:  Negative for chest pain and leg swelling.   Gastrointestinal:  Negative for abdominal pain, constipation, diarrhea, nausea and vomiting.   Genitourinary:  Positive for hematuria. Negative for difficulty urinating, dysuria, flank pain, frequency and urgency.   Musculoskeletal:  Negative for back pain, joint swelling and myalgias.   Skin:  Negative for rash and wound.   Neurological:  Negative for dizziness, tremors, weakness and numbness.   Hematological:  Does not bruise/bleed easily.

## 2025-02-10 NOTE — PROGRESS NOTES
[facility] PHYSICIANS [facility], [facility] UROLOGY CENTER  [address]  [facility] [address]  Dept: [phone]    [facility] Urology Office Note - New Patient    Patient:  Michelle Verma  YOB: 1935  Date: 2/10/2025    The patient is a 89 y.o. female who presentstoday for evaluation of the following problems:   Chief Complaint   Patient presents with    Nephrolithiasis    referred by Oliver Gilliam MD.    HPI  This is a very pleasant 89-year-old female who is seeing us for gross hematuria.  She does have worsening kidney function and had a recent renal ultrasound which shows nonobstructing small left renal stones.  This was done at an outside facility and I just have a report for my review but no images.  She does have occasional urge incontinence.  She otherwise voices no urologic complaints.    (Patient's old records have been requested, reviewed and summarized in today's note.)    Summary of old records: N/A    History: N/A    ProceduresToday: N/A    Urinalysis today:  No results found for this visit on 02/10/25.    AUA Symptom Score (2/10/2025):                               Last BUN andcreatinine:  Lab Results   Component Value Date    BUN 36 (H) 01/28/2025     Lab Results   Component Value Date    CREATININE 2.2 (H) 01/28/2025       Additional Lab/Culture results: none    Reviewed during this Office Visit: none  (results were independently reviewed byphysician and radiology report verified)    PAST MEDICAL, FAMILY AND SOCIAL HISTORY:  Past Medical History:   Diagnosis Date    Anxiety     Arthritis     Benign familial tremor     Brain tumor (benign) (HCC)     Cellulitis of leg     CKD (chronic kidney disease)     Dementia (HCC)     Depression     Essential tremor     INDIGO (generalized anxiety disorder)     Hyperlipemia     Hypertension     Major depressive disorder with single episode     Melanocytic nevus     Nevus     Osteopathia     Other specified

## 2025-02-13 ENCOUNTER — HOSPITAL ENCOUNTER (OUTPATIENT)
Age: 89
Setting detail: SPECIMEN
Discharge: HOME | End: 2025-02-13

## 2025-02-13 LAB
ANION GAP SERPL CALCULATED.3IONS-SCNC: 12 MMOL/L (ref 9–16)
BUN SERPL-MCNC: 36 MG/DL (ref 8–23)
CALCIUM SERPL-MCNC: 8.3 MG/DL (ref 8.6–10.4)
CHLORIDE SERPL-SCNC: 106 MMOL/L (ref 98–107)
CO2 SERPL-SCNC: 21 MMOL/L (ref 20–31)
CREAT SERPL-MCNC: 1.4 MG/DL (ref 0.6–0.9)
ERYTHROCYTE [DISTWIDTH] IN BLOOD BY AUTOMATED COUNT: 13.7 % (ref 11.8–14.4)
GFR, ESTIMATED: 36 ML/MIN/1.73M2
GLUCOSE SERPL-MCNC: 122 MG/DL (ref 74–99)
HCT VFR BLD AUTO: 34.2 % (ref 36.3–47.1)
HGB BLD-MCNC: 11.2 G/DL (ref 11.9–15.1)
MCH RBC QN AUTO: 29.8 PG (ref 25.2–33.5)
MCHC RBC AUTO-ENTMCNC: 32.7 G/DL (ref 28.4–34.8)
MCV RBC AUTO: 91 FL (ref 82.6–102.9)
NRBC BLD-RTO: 0 PER 100 WBC
PLATELET # BLD AUTO: 305 K/UL (ref 138–453)
PMV BLD AUTO: 10.8 FL (ref 8.1–13.5)
POTASSIUM SERPL-SCNC: 4.1 MMOL/L (ref 3.7–5.3)
RBC # BLD AUTO: 3.76 M/UL (ref 3.95–5.11)
SODIUM SERPL-SCNC: 139 MMOL/L (ref 136–145)
WBC OTHER # BLD: 5.6 K/UL (ref 3.5–11.3)

## 2025-02-13 PROCEDURE — 36415 COLL VENOUS BLD VENIPUNCTURE: CPT

## 2025-02-13 PROCEDURE — 85027 COMPLETE CBC AUTOMATED: CPT

## 2025-02-13 PROCEDURE — 80048 BASIC METABOLIC PNL TOTAL CA: CPT

## 2025-02-21 ENCOUNTER — HOSPITAL ENCOUNTER (OUTPATIENT)
Age: 89
Setting detail: SPECIMEN
Discharge: HOME | End: 2025-02-21

## 2025-02-21 LAB
ANION GAP SERPL CALCULATED.3IONS-SCNC: 11 MMOL/L (ref 9–16)
BACTERIA URNS QL MICRO: NORMAL
BASOPHILS # BLD: 0.07 K/UL (ref 0–0.2)
BASOPHILS NFR BLD: 1 % (ref 0–2)
BILIRUB UR QL STRIP: NEGATIVE
BUN SERPL-MCNC: 28 MG/DL (ref 8–23)
CALCIUM SERPL-MCNC: 8.5 MG/DL (ref 8.6–10.4)
CASTS #/AREA URNS LPF: NORMAL /LPF (ref 0–8)
CHLORIDE SERPL-SCNC: 109 MMOL/L (ref 98–107)
CLARITY UR: ABNORMAL
CO2 SERPL-SCNC: 23 MMOL/L (ref 20–31)
COLOR UR: ABNORMAL
CREAT SERPL-MCNC: 1.3 MG/DL (ref 0.6–0.9)
EOSINOPHIL # BLD: 0.4 K/UL (ref 0–0.44)
EOSINOPHILS RELATIVE PERCENT: 5 % (ref 1–4)
EPI CELLS #/AREA URNS HPF: NORMAL /HPF (ref 0–5)
ERYTHROCYTE [DISTWIDTH] IN BLOOD BY AUTOMATED COUNT: 14.3 % (ref 11.8–14.4)
GFR, ESTIMATED: 40 ML/MIN/1.73M2
GLUCOSE SERPL-MCNC: 90 MG/DL (ref 74–99)
GLUCOSE UR STRIP-MCNC: NEGATIVE MG/DL
HCT VFR BLD AUTO: 32.4 % (ref 36.3–47.1)
HGB BLD-MCNC: 10.4 G/DL (ref 11.9–15.1)
HGB UR QL STRIP.AUTO: ABNORMAL
IMM GRANULOCYTES # BLD AUTO: <0.03 K/UL (ref 0–0.3)
IMM GRANULOCYTES NFR BLD: 0 %
KETONES UR STRIP-MCNC: NEGATIVE MG/DL
LEUKOCYTE ESTERASE UR QL STRIP: ABNORMAL
LYMPHOCYTES NFR BLD: 2.62 K/UL (ref 1.1–3.7)
LYMPHOCYTES RELATIVE PERCENT: 35 % (ref 24–43)
MAGNESIUM SERPL-MCNC: 2 MG/DL (ref 1.6–2.4)
MCH RBC QN AUTO: 29.4 PG (ref 25.2–33.5)
MCHC RBC AUTO-ENTMCNC: 32.1 G/DL (ref 28.4–34.8)
MCV RBC AUTO: 91.5 FL (ref 82.6–102.9)
MONOCYTES NFR BLD: 0.85 K/UL (ref 0.1–1.2)
MONOCYTES NFR BLD: 12 % (ref 3–12)
NEUTROPHILS NFR BLD: 47 % (ref 36–65)
NEUTS SEG NFR BLD: 3.44 K/UL (ref 1.5–8.1)
NITRITE UR QL STRIP: NEGATIVE
NRBC BLD-RTO: 0 PER 100 WBC
PH UR STRIP: 5.5 [PH] (ref 5–8)
PHOSPHATE SERPL-MCNC: 3.6 MG/DL (ref 2.5–4.5)
PLATELET # BLD AUTO: 261 K/UL (ref 138–453)
PMV BLD AUTO: 11.4 FL (ref 8.1–13.5)
POTASSIUM SERPL-SCNC: 4.5 MMOL/L (ref 3.7–5.3)
PROT UR STRIP-MCNC: ABNORMAL MG/DL
RBC # BLD AUTO: 3.54 M/UL (ref 3.95–5.11)
RBC #/AREA URNS HPF: NORMAL /HPF (ref 0–4)
SODIUM SERPL-SCNC: 143 MMOL/L (ref 136–145)
SP GR UR STRIP: 1.02 (ref 1–1.03)
UROBILINOGEN UR STRIP-ACNC: NORMAL EU/DL (ref 0–1)
WBC #/AREA URNS HPF: NORMAL /HPF (ref 0–5)
WBC OTHER # BLD: 7.4 K/UL (ref 3.5–11.3)

## 2025-02-21 PROCEDURE — 81001 URINALYSIS AUTO W/SCOPE: CPT

## 2025-02-21 PROCEDURE — 83735 ASSAY OF MAGNESIUM: CPT

## 2025-02-21 PROCEDURE — 36415 COLL VENOUS BLD VENIPUNCTURE: CPT

## 2025-02-21 PROCEDURE — 85025 COMPLETE CBC W/AUTO DIFF WBC: CPT

## 2025-02-21 PROCEDURE — 87086 URINE CULTURE/COLONY COUNT: CPT

## 2025-02-21 PROCEDURE — 80048 BASIC METABOLIC PNL TOTAL CA: CPT

## 2025-02-21 PROCEDURE — 84100 ASSAY OF PHOSPHORUS: CPT

## 2025-02-22 LAB
MICROORGANISM SPEC CULT: NO GROWTH
SERVICE CMNT-IMP: NORMAL
SPECIMEN DESCRIPTION: NORMAL

## 2025-02-25 ENCOUNTER — HOSPITAL ENCOUNTER (OUTPATIENT)
Dept: CT IMAGING | Age: 89
Discharge: HOME | End: 2025-02-27
Attending: UROLOGY
Payer: COMMERCIAL

## 2025-02-25 DIAGNOSIS — N20.0 KIDNEY STONE: ICD-10-CM

## 2025-02-25 PROCEDURE — 74176 CT ABD & PELVIS W/O CONTRAST: CPT

## 2025-02-27 PROBLEM — M85.80 OTHER SPECIFIED DISORDERS OF BONE DENSITY AND STRUCTURE, UNSPECIFIED SITE: Status: ACTIVE | Noted: 2023-12-08

## 2025-02-27 PROBLEM — Z90.49 ACQUIRED ABSENCE OF OTHER SPECIFIED PARTS OF DIGESTIVE TRACT: Status: ACTIVE | Noted: 2023-12-08

## 2025-02-27 PROBLEM — Z86.018 PERSONAL HISTORY OF OTHER BENIGN NEOPLASM: Status: ACTIVE | Noted: 2023-12-08

## 2025-02-27 PROBLEM — Z28.311 PARTIALLY VACCINATED FOR COVID-19: Status: ACTIVE | Noted: 2023-12-08

## 2025-02-27 PROBLEM — R53.1 WEAKNESS: Status: ACTIVE | Noted: 2024-01-12

## 2025-02-27 PROBLEM — R29.6 REPEATED FALLS: Status: ACTIVE | Noted: 2023-12-08

## 2025-02-27 PROBLEM — Z87.891 PERSONAL HISTORY OF NICOTINE DEPENDENCE: Status: ACTIVE | Noted: 2023-12-08

## 2025-02-27 PROBLEM — R25.1 TREMOR, UNSPECIFIED: Status: ACTIVE | Noted: 2017-04-05

## 2025-02-27 PROBLEM — R26.89 OTHER ABNORMALITIES OF GAIT AND MOBILITY: Status: ACTIVE | Noted: 2024-01-12

## 2025-02-27 PROBLEM — R41.841 COGNITIVE COMMUNICATION DEFICIT: Status: ACTIVE | Noted: 2024-01-12

## 2025-02-27 PROBLEM — M77.8 OTHER ENTHESOPATHIES, NOT ELSEWHERE CLASSIFIED: Status: ACTIVE | Noted: 2023-12-08

## 2025-02-27 PROBLEM — N18.30 CHRONIC KIDNEY DISEASE, STAGE 3 UNSPECIFIED (HCC): Status: ACTIVE | Noted: 2023-12-08

## 2025-02-27 PROBLEM — Z86.73 PERSONAL HISTORY OF TRANSIENT ISCHEMIC ATTACK (TIA), AND CEREBRAL INFARCTION WITHOUT RESIDUAL DEFICITS: Status: ACTIVE | Noted: 2023-12-08

## 2025-02-27 PROBLEM — I69.351 HEMIPLEGIA AND HEMIPARESIS FOLLOWING CEREBRAL INFARCTION AFFECTING RIGHT DOMINANT SIDE (HCC): Status: ACTIVE | Noted: 2021-08-17

## 2025-02-27 PROBLEM — F03.90 UNSPECIFIED DEMENTIA, UNSPECIFIED SEVERITY, WITHOUT BEHAVIORAL DISTURBANCE, PSYCHOTIC DISTURBANCE, MOOD DISTURBANCE, AND ANXIETY (HCC): Status: ACTIVE | Noted: 2023-12-08

## 2025-02-27 PROBLEM — Z85.841 PERSONAL HISTORY OF MALIGNANT NEOPLASM OF BRAIN: Status: ACTIVE | Noted: 2023-12-08

## 2025-02-27 PROBLEM — Z79.82 LONG TERM (CURRENT) USE OF ASPIRIN: Status: ACTIVE | Noted: 2023-12-08

## 2025-02-27 PROBLEM — I13.10 HYPERTENSIVE HEART AND CHRONIC KIDNEY DISEASE WITHOUT HEART FAILURE, WITH STAGE 1 THROUGH STAGE 4 CHRONIC KIDNEY DISEASE, OR UNSPECIFIED CHRONIC KIDNEY DISEASE: Status: ACTIVE | Noted: 2023-12-08

## 2025-03-07 ENCOUNTER — TELEPHONE (OUTPATIENT)
Dept: UROLOGY | Age: 89
End: 2025-03-07

## 2025-03-07 NOTE — TELEPHONE ENCOUNTER
Writer called facility and left a voicemail letting patient nurse know that we moved appt time up to 100pm.

## 2025-03-10 ENCOUNTER — TELEPHONE (OUTPATIENT)
Dept: UROLOGY | Age: 89
End: 2025-03-10

## 2025-03-10 ENCOUNTER — PREP FOR PROCEDURE (OUTPATIENT)
Dept: UROLOGY | Age: 89
End: 2025-03-10

## 2025-03-10 ENCOUNTER — PROCEDURE VISIT (OUTPATIENT)
Dept: UROLOGY | Age: 89
End: 2025-03-10
Payer: COMMERCIAL

## 2025-03-10 ENCOUNTER — HOSPITAL ENCOUNTER (OUTPATIENT)
Age: 89
Setting detail: SPECIMEN
Discharge: HOME | End: 2025-03-10

## 2025-03-10 VITALS — DIASTOLIC BLOOD PRESSURE: 93 MMHG | TEMPERATURE: 97 F | HEART RATE: 90 BPM | SYSTOLIC BLOOD PRESSURE: 167 MMHG

## 2025-03-10 DIAGNOSIS — R31.0 GROSS HEMATURIA: Primary | ICD-10-CM

## 2025-03-10 DIAGNOSIS — R31.0 GROSS HEMATURIA: ICD-10-CM

## 2025-03-10 DIAGNOSIS — D49.4 BLADDER TUMOR: ICD-10-CM

## 2025-03-10 PROCEDURE — 52000 CYSTOURETHROSCOPY: CPT | Performed by: UROLOGY

## 2025-03-10 NOTE — TELEPHONE ENCOUNTER
Cysto, TURBT @ [facility] 1:00pm 04/11/25  **STOP BLOOD THINNERS ON 04/04/25**    PAT: Sent to Facility     Spoke with patient in office, procedure information given to patient.       [facility]:[place]   [phone]-Phone   [phone]-Fax

## 2025-03-10 NOTE — PROGRESS NOTES
Cystoscopy Operative Note (3/10/25):  Surgeon: Anthony Upton MD  Anesthesia: Urethral 2% Xylocaine  Indications: Gross hematuria  Position: Dorsal Lithotomy    Findings:   Risks and Benefits discussed with patient prior to procedure.  The patient was prepped and draped in the usual sterile fashion.  The flexible cystoscope was advanced through the urethra and into the bladder.  The bladder was thoroughly inspected and the following was noted:    Vagina: atrophic  Residual Urine: none  Urethra: normal appearing urethra with no masses, tenderness or lesions  Bladder: Multiple bladder tumors right side bladder wall.  There was none trabeculation noted.  Ureters: Clear efflux from both ureters.  Orifices with normal configuration and location.    The cystoscope was removed.  The patient tolerated the procedure well.    Agree with the ROS entered by the MA.    Plan: TURBT

## 2025-03-11 LAB
MICROORGANISM SPEC CULT: NORMAL
SERVICE CMNT-IMP: NORMAL
SPECIMEN DESCRIPTION: NORMAL

## 2025-03-25 ENCOUNTER — HOSPITAL ENCOUNTER (OUTPATIENT)
Age: 89
Setting detail: SPECIMEN
Discharge: HOME | End: 2025-03-25
Payer: COMMERCIAL

## 2025-03-25 LAB
BACTERIA URNS QL MICRO: ABNORMAL
BILIRUB UR QL STRIP: NEGATIVE
CLARITY UR: CLEAR
COLOR UR: YELLOW
EPI CELLS #/AREA URNS HPF: ABNORMAL /HPF (ref 0–5)
GLUCOSE UR STRIP-MCNC: NEGATIVE MG/DL
HGB UR QL STRIP.AUTO: ABNORMAL
KETONES UR STRIP-MCNC: NEGATIVE MG/DL
LEUKOCYTE ESTERASE UR QL STRIP: NEGATIVE
MUCOUS THREADS URNS QL MICRO: ABNORMAL
NITRITE UR QL STRIP: NEGATIVE
PH UR STRIP: 5 [PH] (ref 5–8)
PROT UR STRIP-MCNC: ABNORMAL MG/DL
RBC #/AREA URNS HPF: ABNORMAL /HPF (ref 0–2)
SP GR UR STRIP: 1.01 (ref 1–1.03)
UROBILINOGEN UR STRIP-ACNC: NORMAL EU/DL (ref 0–1)
WBC #/AREA URNS HPF: ABNORMAL /HPF (ref 0–5)

## 2025-03-25 PROCEDURE — 87086 URINE CULTURE/COLONY COUNT: CPT

## 2025-03-25 PROCEDURE — 81001 URINALYSIS AUTO W/SCOPE: CPT

## 2025-03-26 LAB
MICROORGANISM SPEC CULT: NORMAL
SERVICE CMNT-IMP: NORMAL
SPECIMEN DESCRIPTION: NORMAL

## 2025-04-11 ENCOUNTER — ANESTHESIA (OUTPATIENT)
Dept: OPERATING ROOM | Age: 89
End: 2025-04-11
Payer: COMMERCIAL

## 2025-04-11 ENCOUNTER — ANESTHESIA EVENT (OUTPATIENT)
Dept: OPERATING ROOM | Age: 89
End: 2025-04-11
Payer: COMMERCIAL

## 2025-04-11 ENCOUNTER — HOSPITAL ENCOUNTER (OUTPATIENT)
Age: 89
Setting detail: OUTPATIENT SURGERY
Discharge: HOME | End: 2025-04-11
Attending: UROLOGY | Admitting: UROLOGY
Payer: COMMERCIAL

## 2025-04-11 VITALS
BODY MASS INDEX: 31.54 KG/M2 | SYSTOLIC BLOOD PRESSURE: 136 MMHG | OXYGEN SATURATION: 96 % | TEMPERATURE: 96.8 F | DIASTOLIC BLOOD PRESSURE: 94 MMHG | RESPIRATION RATE: 14 BRPM | HEIGHT: 63 IN | HEART RATE: 67 BPM | WEIGHT: 178 LBS

## 2025-04-11 DIAGNOSIS — D49.4 BLADDER TUMOR: ICD-10-CM

## 2025-04-11 DIAGNOSIS — G89.18 ACUTE POSTOPERATIVE PAIN: Primary | ICD-10-CM

## 2025-04-11 PROCEDURE — 7100000011 HC PHASE II RECOVERY - ADDTL 15 MIN: Performed by: UROLOGY

## 2025-04-11 PROCEDURE — 88307 TISSUE EXAM BY PATHOLOGIST: CPT

## 2025-04-11 PROCEDURE — 3600000014 HC SURGERY LEVEL 4 ADDTL 15MIN: Performed by: UROLOGY

## 2025-04-11 PROCEDURE — 2500000003 HC RX 250 WO HCPCS: Performed by: NURSE ANESTHETIST, CERTIFIED REGISTERED

## 2025-04-11 PROCEDURE — 7100000001 HC PACU RECOVERY - ADDTL 15 MIN: Performed by: UROLOGY

## 2025-04-11 PROCEDURE — 2500000003 HC RX 250 WO HCPCS: Performed by: UROLOGY

## 2025-04-11 PROCEDURE — 6360000002 HC RX W HCPCS: Performed by: NURSE ANESTHETIST, CERTIFIED REGISTERED

## 2025-04-11 PROCEDURE — 3600000004 HC SURGERY LEVEL 4 BASE: Performed by: UROLOGY

## 2025-04-11 PROCEDURE — 6370000000 HC RX 637 (ALT 250 FOR IP): Performed by: NURSE ANESTHETIST, CERTIFIED REGISTERED

## 2025-04-11 PROCEDURE — 2580000003 HC RX 258: Performed by: NURSE ANESTHETIST, CERTIFIED REGISTERED

## 2025-04-11 PROCEDURE — 7100000000 HC PACU RECOVERY - FIRST 15 MIN: Performed by: UROLOGY

## 2025-04-11 PROCEDURE — 3700000001 HC ADD 15 MINUTES (ANESTHESIA): Performed by: UROLOGY

## 2025-04-11 PROCEDURE — 7100000010 HC PHASE II RECOVERY - FIRST 15 MIN: Performed by: UROLOGY

## 2025-04-11 PROCEDURE — 3700000000 HC ANESTHESIA ATTENDED CARE: Performed by: UROLOGY

## 2025-04-11 PROCEDURE — 2709999900 HC NON-CHARGEABLE SUPPLY: Performed by: UROLOGY

## 2025-04-11 RX ORDER — SODIUM CHLORIDE 0.9 % (FLUSH) 0.9 %
5-40 SYRINGE (ML) INJECTION EVERY 12 HOURS SCHEDULED
Status: DISCONTINUED | OUTPATIENT
Start: 2025-04-11 | End: 2025-04-11 | Stop reason: HOSPADM

## 2025-04-11 RX ORDER — CEFADROXIL 500 MG/1
500 CAPSULE ORAL 2 TIMES DAILY
Qty: 6 CAPSULE | Refills: 0 | Status: SHIPPED | OUTPATIENT
Start: 2025-04-11 | End: 2025-04-14

## 2025-04-11 RX ORDER — ONDANSETRON 2 MG/ML
INJECTION INTRAMUSCULAR; INTRAVENOUS
Status: DISCONTINUED | OUTPATIENT
Start: 2025-04-11 | End: 2025-04-11 | Stop reason: SDUPTHER

## 2025-04-11 RX ORDER — LABETALOL HYDROCHLORIDE 5 MG/ML
10 INJECTION, SOLUTION INTRAVENOUS
Status: DISCONTINUED | OUTPATIENT
Start: 2025-04-11 | End: 2025-04-11 | Stop reason: HOSPADM

## 2025-04-11 RX ORDER — IPRATROPIUM BROMIDE AND ALBUTEROL SULFATE 2.5; .5 MG/3ML; MG/3ML
1 SOLUTION RESPIRATORY (INHALATION)
Status: DISCONTINUED | OUTPATIENT
Start: 2025-04-11 | End: 2025-04-11 | Stop reason: HOSPADM

## 2025-04-11 RX ORDER — ALBUTEROL SULFATE 90 UG/1
INHALANT RESPIRATORY (INHALATION)
Status: DISCONTINUED | OUTPATIENT
Start: 2025-04-11 | End: 2025-04-11 | Stop reason: SDUPTHER

## 2025-04-11 RX ORDER — HYDRALAZINE HYDROCHLORIDE 20 MG/ML
10 INJECTION INTRAMUSCULAR; INTRAVENOUS
Status: DISCONTINUED | OUTPATIENT
Start: 2025-04-11 | End: 2025-04-11 | Stop reason: HOSPADM

## 2025-04-11 RX ORDER — METOCLOPRAMIDE HYDROCHLORIDE 5 MG/ML
10 INJECTION INTRAMUSCULAR; INTRAVENOUS
Status: DISCONTINUED | OUTPATIENT
Start: 2025-04-11 | End: 2025-04-11 | Stop reason: HOSPADM

## 2025-04-11 RX ORDER — ONDANSETRON 2 MG/ML
4 INJECTION INTRAMUSCULAR; INTRAVENOUS
Status: DISCONTINUED | OUTPATIENT
Start: 2025-04-11 | End: 2025-04-11 | Stop reason: HOSPADM

## 2025-04-11 RX ORDER — OXYCODONE HYDROCHLORIDE 5 MG/1
5 TABLET ORAL
Status: DISCONTINUED | OUTPATIENT
Start: 2025-04-11 | End: 2025-04-11 | Stop reason: HOSPADM

## 2025-04-11 RX ORDER — HYOSCYAMINE SULFATE 0.12 MG/1
0.12 TABLET SUBLINGUAL EVERY 4 HOURS PRN
Qty: 20 TABLET | Refills: 0 | Status: SHIPPED | OUTPATIENT
Start: 2025-04-11

## 2025-04-11 RX ORDER — SODIUM CHLORIDE 0.9 % (FLUSH) 0.9 %
5-40 SYRINGE (ML) INJECTION PRN
Status: DISCONTINUED | OUTPATIENT
Start: 2025-04-11 | End: 2025-04-11 | Stop reason: HOSPADM

## 2025-04-11 RX ORDER — NALOXONE HYDROCHLORIDE 0.4 MG/ML
INJECTION, SOLUTION INTRAMUSCULAR; INTRAVENOUS; SUBCUTANEOUS PRN
Status: DISCONTINUED | OUTPATIENT
Start: 2025-04-11 | End: 2025-04-11 | Stop reason: HOSPADM

## 2025-04-11 RX ORDER — MAGNESIUM HYDROXIDE 1200 MG/15ML
LIQUID ORAL CONTINUOUS PRN
Status: COMPLETED | OUTPATIENT
Start: 2025-04-11 | End: 2025-04-11

## 2025-04-11 RX ORDER — ACETAMINOPHEN 500 MG
500 TABLET ORAL EVERY 6 HOURS PRN
Qty: 30 TABLET | Refills: 0 | Status: SHIPPED | OUTPATIENT
Start: 2025-04-11

## 2025-04-11 RX ORDER — LABETALOL HYDROCHLORIDE 5 MG/ML
INJECTION, SOLUTION INTRAVENOUS
Status: DISCONTINUED | OUTPATIENT
Start: 2025-04-11 | End: 2025-04-11 | Stop reason: SDUPTHER

## 2025-04-11 RX ORDER — PROPOFOL 10 MG/ML
INJECTION, EMULSION INTRAVENOUS
Status: DISCONTINUED | OUTPATIENT
Start: 2025-04-11 | End: 2025-04-11 | Stop reason: SDUPTHER

## 2025-04-11 RX ORDER — SODIUM CHLORIDE 9 MG/ML
INJECTION, SOLUTION INTRAVENOUS PRN
Status: DISCONTINUED | OUTPATIENT
Start: 2025-04-11 | End: 2025-04-11 | Stop reason: HOSPADM

## 2025-04-11 RX ORDER — LIDOCAINE HYDROCHLORIDE 10 MG/ML
INJECTION, SOLUTION INFILTRATION; PERINEURAL
Status: DISCONTINUED | OUTPATIENT
Start: 2025-04-11 | End: 2025-04-11 | Stop reason: SDUPTHER

## 2025-04-11 RX ORDER — DEXAMETHASONE SODIUM PHOSPHATE 10 MG/ML
INJECTION, SOLUTION INTRA-ARTICULAR; INTRALESIONAL; INTRAMUSCULAR; INTRAVENOUS; SOFT TISSUE
Status: DISCONTINUED | OUTPATIENT
Start: 2025-04-11 | End: 2025-04-11 | Stop reason: SDUPTHER

## 2025-04-11 RX ORDER — OXYCODONE HYDROCHLORIDE 5 MG/1
5 TABLET ORAL EVERY 6 HOURS PRN
Qty: 8 TABLET | Refills: 0 | Status: SHIPPED | OUTPATIENT
Start: 2025-04-11 | End: 2025-04-16

## 2025-04-11 RX ORDER — ROCURONIUM BROMIDE 10 MG/ML
INJECTION, SOLUTION INTRAVENOUS
Status: DISCONTINUED | OUTPATIENT
Start: 2025-04-11 | End: 2025-04-11 | Stop reason: SDUPTHER

## 2025-04-11 RX ORDER — FENTANYL CITRATE 50 UG/ML
INJECTION, SOLUTION INTRAMUSCULAR; INTRAVENOUS
Status: DISCONTINUED | OUTPATIENT
Start: 2025-04-11 | End: 2025-04-11 | Stop reason: SDUPTHER

## 2025-04-11 RX ORDER — CEFAZOLIN SODIUM 1 G/3ML
INJECTION, POWDER, FOR SOLUTION INTRAMUSCULAR; INTRAVENOUS
Status: DISCONTINUED | OUTPATIENT
Start: 2025-04-11 | End: 2025-04-11 | Stop reason: SDUPTHER

## 2025-04-11 RX ORDER — SODIUM CHLORIDE, SODIUM LACTATE, POTASSIUM CHLORIDE, CALCIUM CHLORIDE 600; 310; 30; 20 MG/100ML; MG/100ML; MG/100ML; MG/100ML
INJECTION, SOLUTION INTRAVENOUS
Status: DISCONTINUED | OUTPATIENT
Start: 2025-04-11 | End: 2025-04-11 | Stop reason: SDUPTHER

## 2025-04-11 RX ADMIN — ROCURONIUM BROMIDE 10 MG: 10 INJECTION INTRAVENOUS at 13:57

## 2025-04-11 RX ADMIN — ROCURONIUM BROMIDE 50 MG: 10 INJECTION INTRAVENOUS at 13:38

## 2025-04-11 RX ADMIN — ALBUTEROL SULFATE 7 PUFF: 90 AEROSOL, METERED RESPIRATORY (INHALATION) at 14:33

## 2025-04-11 RX ADMIN — LIDOCAINE HYDROCHLORIDE 50 MG: 10 INJECTION, SOLUTION INFILTRATION; PERINEURAL at 13:38

## 2025-04-11 RX ADMIN — ONDANSETRON 4 MG: 2 INJECTION INTRAMUSCULAR; INTRAVENOUS at 13:49

## 2025-04-11 RX ADMIN — SUGAMMADEX 200 MG: 100 INJECTION, SOLUTION INTRAVENOUS at 14:35

## 2025-04-11 RX ADMIN — SODIUM CHLORIDE, POTASSIUM CHLORIDE, SODIUM LACTATE AND CALCIUM CHLORIDE: 600; 310; 30; 20 INJECTION, SOLUTION INTRAVENOUS at 13:30

## 2025-04-11 RX ADMIN — FENTANYL CITRATE 50 MCG: 50 INJECTION, SOLUTION INTRAMUSCULAR; INTRAVENOUS at 13:38

## 2025-04-11 RX ADMIN — PROPOFOL 150 MG: 10 INJECTION, EMULSION INTRAVENOUS at 13:38

## 2025-04-11 RX ADMIN — Medication 5 MG: at 14:42

## 2025-04-11 RX ADMIN — CEFAZOLIN 2 G: 1 INJECTION, POWDER, FOR SOLUTION INTRAMUSCULAR; INTRAVENOUS at 13:49

## 2025-04-11 RX ADMIN — Medication 5 MG: at 14:38

## 2025-04-11 RX ADMIN — PROPOFOL 50 MG: 10 INJECTION, EMULSION INTRAVENOUS at 14:03

## 2025-04-11 RX ADMIN — DEXAMETHASONE SODIUM PHOSPHATE 10 MG: 10 INJECTION INTRAMUSCULAR; INTRAVENOUS at 13:49

## 2025-04-11 RX ADMIN — FENTANYL CITRATE 50 MCG: 50 INJECTION, SOLUTION INTRAMUSCULAR; INTRAVENOUS at 14:01

## 2025-04-11 ASSESSMENT — PAIN - FUNCTIONAL ASSESSMENT: PAIN_FUNCTIONAL_ASSESSMENT: NONE - DENIES PAIN

## 2025-04-11 NOTE — H&P
Won Ortiz, Lazaro, Lily, Mateo, Moncho, Jojo Mane.  Urology History & Physical      Patient:  Michelle Verma  MRN: [id number]  YOB: 1935    CHIEF COMPLAINT: Bladder tumors    HISTORY OF PRESENT ILLNESS:   The patient is a 89 y.o. female with a history of gross hematuria and CKD.  She presented to the office recently where cystoscopy was performed and noted to have multiple right wall bladder tumors.    Patient's old records, notes and chart reviewed and summarized above.    Past Medical History:    Past Medical History:   Diagnosis Date    Anxiety     Arthritis     Benign familial tremor     Blood in urine     Brain tumor (benign) (HCC)     Cellulitis of leg     CKD (chronic kidney disease)     Dementia (HCC)     Depression     Essential tremor     INDIGO (generalized anxiety disorder)     H/O left flank pain     Hyperlipemia     Hypertension     Impaired mobility     Lives in nursing home     [facility] :  # [phone] , fax # [phone]    Major depressive disorder with single episode     Melanocytic nevus     Muscle weakness     Nevus     Osteopathia     Other specified disorders of bone density and structure, unspecified site     Right shoulder tendonitis     Stroke (HCC)     Tremors of nervous system     Ventral hernia        Past Surgical History:    Past Surgical History:   Procedure Laterality Date    CHOLECYSTECTOMY      VENTRAL HERNIA REPAIR         Medications:    No current facility-administered medications for this encounter.    Current Outpatient Medications:     losartan-hydroCHLOROthiazide (HYZAAR) 50-12.5 MG per tablet, , Disp: , Rfl:     docusate sodium (COLACE) 50 MG capsule, Take 1 capsule by mouth daily, Disp: , Rfl:     aspirin 81 MG chewable tablet, Take 1 tablet by mouth daily, Disp: , Rfl:     atorvastatin (LIPITOR) 40 MG tablet, Take 1 tablet by mouth daily, Disp: , Rfl:     Multiple Vitamins-Minerals (CENTRUM SILVER) TABS, Take 1 tablet by mouth  sided bladder tumors.    Plan:  -OR for cystoscopy and TURBT      Thank you for involving us in the care of Michelle Verma. Should you have any questions, please do not hesitate to contact us at any time.    Fernando Mederos MD  Urology Resident, PGY-4

## 2025-04-11 NOTE — DISCHARGE INSTRUCTIONS
Transurethral resection of Bladder Tumor:  The patient will be discharged home with polo.     You may see blood in the urine after the procedure.  This should resolve over the next couple days.  Please stay hydrated.  If the blood in the urine becomes significant, and doesn't improve to a clear/pink appearance, please call.  You may experience frequency/urgency of urination after the procedure.  We expect these symptoms to improve over the next couple weeks.      Tylenol for pain control  Pt ok to discharge home in good condition  No heavy lifting, >10 lbs for today  Pt should avoid strenuous activity for today  Pt should walk moderately at home  Pt ok to shower   Pt may resume diet as tolerated  Pt should take Rx as directed  No driving while on narcotics  Please call attending physician or hospital  with questions  Call or Present to ED if fever (> 101F), intractable nausea vomiting or pain.    Pt should follow up with Dr. Upton, on Thursday 4/17/2025 to have catheter removed, call to confirm appointment    Home with polo catheter.  Please teach polo education and send home with leg and night bag.  You may see intermittent blood in the urine while the catheter in place.  If the catheter becomes obstructed and needs to be exchanged, please call.    No alcoholic beverages, no driving or operating machinery, no making important decisions for 24 hours.   You may have a normal diet but should eat lightly day of surgery.  Drink plenty of fluids.  Urinate within 8 hours after surgery, if unable to urinate call your doctor

## 2025-04-11 NOTE — OP NOTE
Operative Note      Patient: Michelle Verma  YOB: 1935  MRN: [id number]    Date of Procedure: 4/11/2025    Pre-Op Diagnosis Codes:      * Bladder tumor [D49.4]    Post-Op Diagnosis: Same       Procedure(s):  Cystoscopy, transurethral resection bladder tumor (LARGE)    Surgeon(s):  Anthony Upton MD    Assistant:   Resident: Michel Aguilar MD    Anesthesia: General    Estimated Blood Loss (mL): Minimal    Complications: None    Specimens:   ID Type Source Tests Collected by Time Destination   A : BLADDER TUMOR Tissue Bladder SURGICAL PATHOLOGY Anthony Upton MD 4/11/2025 1431        Implants:  * No implants in log *      Drains:   Urinary Catheter 04/11/25 3 Way (Active)   Site Assessment No urethral drainage 04/11/25 1522   Urine Appearance Clear 04/11/25 1522   Collection Container Standard 04/11/25 1522   Securement Method Leg strap 04/11/25 1522   Catheter Best Practices  Drainage tube clipped to bed;Catheter secured to thigh;Tamper seal intact;Bag below bladder;Bag not on floor;Lack of dependent loop in tubing;Drainage bag less than half full 04/11/25 1522   Status Draining;Patent 04/11/25 1522     Indications: Patient is a 89-year-old female with multiple right bladder wall tumors seen on cystoscopy.  Patient presents for above procedure.  She is informed the risk benefits of procedure she agreed to proceed.  Informed consent was obtained.    Findings:  Infection Present At Time Of Surgery (PATOS) (choose all levels that have infection present):  No infection present  Other Findings:   Cystoscopy: Multiple papillary bladder tumors - 3 cm posterior wall papillary bladder tumor, 2 cm right posterior lateral wall papillary bladder tumor, 3 cm papillary bladder tumor at right trigone spanning to the right lateral wall that was adjacent to the right ureteral orifice.     Detailed Description of Procedure: Patient was brought the operative room and placed in dorsal so lithotomy position.  Anesthesia team  administered general anesthesia care.  EPC cuffs were placed intact for entirety of the procedure.  2 g IV Ancef was administered for prophylaxis.  A timeout was called using 2 patient identifiers and everyone in the room agreed to the procedure.    A 26 Citizen [place] resectoscope with visual obturator was placed per the urethra into the bladder. Pan cystoscopy revealed multiple papillary bladder tumors-3 cm posterior wall papillary bladder tumor, 2 cm right posterior lateral wall papillary bladder tumor, 3 cm papillary bladder tumor at the right trigone spanning to the right lateral wall that was adjacent to the right ureteral orifice.     The visual  was replaced with the working element with the resecting loop, and all bladder tumors were resected in their entirety down to the muscular layer.  The resection sites were fulgurated for hemostasis.  There is no bladder perforation visualized. Bilateral ureteral orifices were uninvolved by the resection.     A 20 Citizen of [place] three-way Andersen catheter was inserted per the urethra and into the bladder, with good urinary drainage.  The third port of the Andersen catheter was plugged with a catheter plug.    This concluded procedure.  Patient was awakened and taken to the PACU in normal and stable condition.    Dr. Upton was present for all critical portions of the procedure.    Disposition: Patient is discharged home in stable condition with Andersen catheter in place.  Patient will follow-up for Andersen catheter removal in 6 days.    Electronically signed by Michel Aguilar MD on 4/11/2025 at 4:01 PM

## 2025-04-11 NOTE — ANESTHESIA POSTPROCEDURE EVALUATION
Department of Anesthesiology  Postprocedure Note    Patient: Michelle Verma  MRN: [id number]  YOB: 1935  Date of evaluation: 4/11/2025    Procedure Summary       Date: 04/11/25 Room / Location: [address]    Anesthesia Start: 1334 Anesthesia Stop: 1445    Procedure: CYSTOSCOPY TRANSURETHRAL RESECTION BLADDER TUMOR (GYRUS) Diagnosis:       Bladder tumor      (Bladder tumor [D49.4])    Surgeons: Anthony Upton MD Responsible Provider: Justin Ghosh MD    Anesthesia Type: general ASA Status: 3            Anesthesia Type: No value filed.    Carson Phase I: Carson Score: 10    Carson Phase II: Carson Score: 10    Anesthesia Post Evaluation    Patient location during evaluation: PACU  Patient participation: complete - patient participated  Level of consciousness: awake  Airway patency: patent  Nausea & Vomiting: no nausea and no vomiting  Cardiovascular status: blood pressure returned to baseline  Respiratory status: acceptable  Hydration status: euvolemic  Comments: BP (!) 136/94   Pulse 67   Temp 96.8 °F (36 °C) (Temporal)   Resp 14   Ht 1.6 m (5' 3\")   Wt 80.7 kg (178 lb)   SpO2 96%   BMI 31.53 kg/m²   Pain Assessment: None - Denies Pain Pain Level: 0    No notable events documented.

## 2025-04-11 NOTE — ANESTHESIA PRE PROCEDURE
Department of Anesthesiology  Preprocedure Note       Name:  Michelle Verma   Age:  89 y.o.  :  1935                                          MRN:  [id number]         Date:  2025      Surgeon: Surgeon(s):  Anthony Upton MD    Procedure: Procedure(s):  CYSTOSCOPY TRANSURETHRAL RESECTION BLADDER TUMOR (GYRUS)    Medications prior to admission:   Prior to Admission medications    Medication Sig Start Date End Date Taking? Authorizing Provider   losartan-hydroCHLOROthiazide (HYZAAR) 50-12.5 MG per tablet  25  Yes Vijay De Leon MD   docusate sodium (COLACE) 50 MG capsule Take 1 capsule by mouth daily   Yes Vijay De Leon MD   acetaminophen (TYLENOL) 325 MG tablet Take 2 tablets by mouth every 6 hours as needed for Pain   Yes Vijay De Leon MD   aspirin 81 MG chewable tablet Take 1 tablet by mouth daily   Yes Vijay De Leon MD   atorvastatin (LIPITOR) 40 MG tablet Take 1 tablet by mouth daily   Yes Vijay De Leon MD   Multiple Vitamins-Minerals (CENTRUM SILVER) TABS Take 1 tablet by mouth daily   Yes Vijay De Leon MD   clonazePAM (KLONOPIN) 0.5 MG tablet Take 1 tablet by mouth 2 times daily as needed for Anxiety.   Yes Vijay De Leon MD   memantine (NAMENDA) 10 MG tablet Take 1 tablet by mouth 2 times daily   Yes Vijay De Leon MD   PARoxetine (PAXIL) 20 MG tablet Take 1 tablet by mouth every morning   Yes Vijay De Leon MD       Current medications:    No current facility-administered medications for this encounter.       Allergies:    Allergies   Allergen Reactions   • Fentanyl    • Fexofenadine Hallucinations       Problem List:    Patient Active Problem List   Diagnosis Code   • Temporary cerebral vascular dysfunction I67.82   • Encounter for palliative care Z51.5   • ACP (advance care planning) Z71.89   • Anxiety disorder, unspecified F41.9   • Brain tumor (benign) (HCC) D33.2   • Cerebral infarction, left hemisphere (HCC) I63.9   •  diastolic function.  ·  Right Ventricle: Right ventricle size is normal. Normal systolic function.  ·  Aortic Valve: Trileaflet valve. Mild sclerosis of the aortic valve cusp. No regurgitation.  ·  Mitral Valve: Valve structure is normal. No regurgitation.  ·  Tricuspid Valve: Valve structure is normal. No regurgitation.  ·  Left Atrium: Left atrium size is normal.  ·  Interatrial Septum: Agitated saline study was negative without provocation.  ·  Right Atrium: Right atrium size is normal.  ·  Pericardium: No pericardial effusion.  ·  Image quality is adequate.       Neuro/Psych:   (+) CVA:, neuromuscular disease:, psychiatric history:dementia            GI/Hepatic/Renal:   (+) bowel prep          Endo/Other:    (+) : arthritis:..                 Abdominal:             Vascular:          Other Findings:         Anesthesia Plan      general     ASA 3       Induction: intravenous.      Anesthetic plan and risks discussed with patient.      Plan discussed with CRNA.                Amparo Case MD   4/11/2025

## 2025-04-15 LAB — SURGICAL PATHOLOGY REPORT: NORMAL

## 2025-04-17 ENCOUNTER — TELEPHONE (OUTPATIENT)
Dept: UROLOGY | Age: 89
End: 2025-04-17

## 2025-04-17 NOTE — TELEPHONE ENCOUNTER
Taylor ROSALES from [facility] called and wanted to see if she could remove the cath from her procedure. CNP gave verbal orders to Taylor SALVADOR to remove the cath. Taylor thanked.

## 2025-04-21 ENCOUNTER — OFFICE VISIT (OUTPATIENT)
Dept: UROLOGY | Age: 89
End: 2025-04-21
Payer: COMMERCIAL

## 2025-04-21 VITALS
BODY MASS INDEX: 31.54 KG/M2 | SYSTOLIC BLOOD PRESSURE: 122 MMHG | DIASTOLIC BLOOD PRESSURE: 83 MMHG | WEIGHT: 178 LBS | TEMPERATURE: 97.3 F | HEIGHT: 63 IN | OXYGEN SATURATION: 96 % | HEART RATE: 75 BPM

## 2025-04-21 DIAGNOSIS — C67.8 MALIGNANT NEOPLASM OF OVERLAPPING SITES OF BLADDER (HCC): Primary | ICD-10-CM

## 2025-04-21 PROCEDURE — G8427 DOCREV CUR MEDS BY ELIG CLIN: HCPCS | Performed by: UROLOGY

## 2025-04-21 PROCEDURE — 1159F MED LIST DOCD IN RCRD: CPT | Performed by: UROLOGY

## 2025-04-21 PROCEDURE — 1036F TOBACCO NON-USER: CPT | Performed by: UROLOGY

## 2025-04-21 PROCEDURE — 1124F ACP DISCUSS-NO DSCNMKR DOCD: CPT | Performed by: UROLOGY

## 2025-04-21 PROCEDURE — G8417 CALC BMI ABV UP PARAM F/U: HCPCS | Performed by: UROLOGY

## 2025-04-21 PROCEDURE — 1090F PRES/ABSN URINE INCON ASSESS: CPT | Performed by: UROLOGY

## 2025-04-21 PROCEDURE — 99214 OFFICE O/P EST MOD 30 MIN: CPT | Performed by: UROLOGY

## 2025-04-21 ASSESSMENT — ENCOUNTER SYMPTOMS
COUGH: 0
CONSTIPATION: 0
ABDOMINAL PAIN: 0
EYE REDNESS: 0
EYE PAIN: 0
SHORTNESS OF BREATH: 0
NAUSEA: 0
BACK PAIN: 0
WHEEZING: 0
DIARRHEA: 0
VOMITING: 0

## 2025-04-21 NOTE — PROGRESS NOTES
[facility] PHYSICIANS [facility], [facility] UROLOGY CENTER  [address]  [facility] [address]  Dept: [phone]    [facility] Urology Office Note - Established    Patient:  Michelle Verma  YOB: 1935  Date: 4/21/2025    The patient is a 89 y.o. female whopresents today for evaluation of the following problems:   Chief Complaint   Patient presents with    Results     S/p TURBT results        HPI  This is a very pleasant 89-year-old female who recently underwent TURBT.  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  She has recovered well and is not having any bothersome urinary symptoms right now.  She is here with her family to discuss management of her bladder cancer.    Summary of old records: N/A    Additional History: N/A    Procedures Today: N/A    Urinalysis today:  No results found for this visit on 04/21/25.    Imaging Reviewed during this Office Visit: none  (results were independently reviewed by physician and radiology report verified)    AUA Symptom Score (4/21/2025):                               Last BUN and creatinine:  Lab Results   Component Value Date    BUN 28 (H) 02/21/2025     Lab Results   Component Value Date    CREATININE 1.3 (H) 02/21/2025       Additional Lab/Culture results: none    PAST MEDICAL, FAMILY AND SOCIAL HISTORY UPDATE:  Past Medical History:   Diagnosis Date    Anxiety     Arthritis     Benign familial tremor     Blood in urine     Brain tumor (benign) (HCC)     Cellulitis of leg     CKD (chronic kidney disease)     Dementia (HCC)     Depression     Essential tremor     INDIGO (generalized anxiety disorder)     H/O left flank pain     Hyperlipemia     Hypertension     Impaired mobility     Lives in nursing home     [facility] :  # [phone] , fax # [phone]    Major depressive disorder with single episode     Melanocytic nevus     Muscle weakness     Nevus

## 2025-07-21 ENCOUNTER — PROCEDURE VISIT (OUTPATIENT)
Dept: UROLOGY | Age: 89
End: 2025-07-21
Payer: COMMERCIAL

## 2025-07-21 DIAGNOSIS — C67.8 MALIGNANT NEOPLASM OF OVERLAPPING SITES OF BLADDER (HCC): Primary | ICD-10-CM

## 2025-07-21 PROCEDURE — 52000 CYSTOURETHROSCOPY: CPT | Performed by: UROLOGY

## 2025-07-21 RX ORDER — ONDANSETRON 4 MG/1
TABLET, ORALLY DISINTEGRATING ORAL
COMMUNITY
Start: 2025-05-24

## 2025-07-21 NOTE — PROGRESS NOTES
Cystoscopy Operative Note (7/21/25):  Surgeon: Anthony Upton MD  Anesthesia: Urethral 2% Xylocaine  Indications: Bladder cancer  Position: Dorsal Lithotomy    Findings:   Risks and Benefits discussed with patient prior to procedure.  The patient was prepped and draped in the usual sterile fashion.  The flexible cystoscope was advanced through the urethra and into the bladder.  The bladder was thoroughly inspected and the following was noted:    Vagina: atrophic  Residual Urine: none  Urethra: normal appearing urethra with no masses, tenderness or lesions  Bladder: No tumors or CIS noted.  No bladder diverticulum.  There was none trabeculation noted.  Ureters: Clear efflux from both ureters.  Orifices with normal configuration and location.    The cystoscope was removed.  The patient tolerated the procedure well.    Agree with the ROS entered by the MA.    Plan: Follow-up surveillance cystoscopy in 3

## (undated) DEVICE — PROTECTOR ULN NRV PUR FOAM HK LOOP STRP ANATOMICALLY

## (undated) DEVICE — Device

## (undated) DEVICE — SYRINGE MED 10ML LUERLOCK TIP W/O SFTY DISP

## (undated) DEVICE — STRAP ARMBRD W1.5XL32IN FOAM STR YET SFT W/ HK AND LOOP

## (undated) DEVICE — CYSTO/BLADDER IRRIGATION SET, REGULATING CLAMP

## (undated) DEVICE — GOWN,SIRUS,NONRNF,SETINSLV,XL,20/CS: Brand: MEDLINE

## (undated) DEVICE — DRAPE,REIN 53X77,STERILE: Brand: MEDLINE

## (undated) DEVICE — SYRINGE MED 20ML STD CLR PLAS LUERLOCK TIP N CTRL DISP

## (undated) DEVICE — GLOVE ORANGE PI 7 1/2   MSG9075

## (undated) DEVICE — GARMENT,MEDLINE,DVT,INT,CALF,MED, GEN2: Brand: MEDLINE